# Patient Record
Sex: MALE | Race: WHITE | NOT HISPANIC OR LATINO | Employment: FULL TIME | URBAN - METROPOLITAN AREA
[De-identification: names, ages, dates, MRNs, and addresses within clinical notes are randomized per-mention and may not be internally consistent; named-entity substitution may affect disease eponyms.]

---

## 2023-02-20 LAB — TESTOSTERONE (NG/DL) IN SER/PLAS: 452 NG/DL (ref 240–1000)

## 2023-04-03 LAB
ALANINE AMINOTRANSFERASE (SGPT) (U/L) IN SER/PLAS: 20 U/L (ref 10–52)
ALBUMIN (G/DL) IN SER/PLAS: 4.6 G/DL (ref 3.4–5)
ALKALINE PHOSPHATASE (U/L) IN SER/PLAS: 43 U/L (ref 33–136)
ANION GAP IN SER/PLAS: 12 MMOL/L (ref 10–20)
ASPARTATE AMINOTRANSFERASE (SGOT) (U/L) IN SER/PLAS: 18 U/L (ref 9–39)
BASOPHILS (10*3/UL) IN BLOOD BY AUTOMATED COUNT: 0.05 X10E9/L (ref 0–0.1)
BASOPHILS/100 LEUKOCYTES IN BLOOD BY AUTOMATED COUNT: 0.8 % (ref 0–2)
BILIRUBIN TOTAL (MG/DL) IN SER/PLAS: 0.4 MG/DL (ref 0–1.2)
CALCIUM (MG/DL) IN SER/PLAS: 9.9 MG/DL (ref 8.6–10.6)
CARBON DIOXIDE, TOTAL (MMOL/L) IN SER/PLAS: 30 MMOL/L (ref 21–32)
CHLORIDE (MMOL/L) IN SER/PLAS: 103 MMOL/L (ref 98–107)
CHOLESTEROL (MG/DL) IN SER/PLAS: 197 MG/DL (ref 0–199)
CHOLESTEROL IN HDL (MG/DL) IN SER/PLAS: 35 MG/DL
CHOLESTEROL/HDL RATIO: 5.6
CREATININE (MG/DL) IN SER/PLAS: 1.02 MG/DL (ref 0.5–1.3)
EOSINOPHILS (10*3/UL) IN BLOOD BY AUTOMATED COUNT: 0.29 X10E9/L (ref 0–0.7)
EOSINOPHILS/100 LEUKOCYTES IN BLOOD BY AUTOMATED COUNT: 4.5 % (ref 0–6)
ERYTHROCYTE DISTRIBUTION WIDTH (RATIO) BY AUTOMATED COUNT: 13.7 % (ref 11.5–14.5)
ERYTHROCYTE MEAN CORPUSCULAR HEMOGLOBIN CONCENTRATION (G/DL) BY AUTOMATED: 33.4 G/DL (ref 32–36)
ERYTHROCYTE MEAN CORPUSCULAR VOLUME (FL) BY AUTOMATED COUNT: 94 FL (ref 80–100)
ERYTHROCYTES (10*6/UL) IN BLOOD BY AUTOMATED COUNT: 5.03 X10E12/L (ref 4.5–5.9)
GFR MALE: 81 ML/MIN/1.73M2
GLUCOSE (MG/DL) IN SER/PLAS: 103 MG/DL (ref 74–99)
HEMATOCRIT (%) IN BLOOD BY AUTOMATED COUNT: 47.3 % (ref 41–52)
HEMOGLOBIN (G/DL) IN BLOOD: 15.8 G/DL (ref 13.5–17.5)
IMMATURE GRANULOCYTES/100 LEUKOCYTES IN BLOOD BY AUTOMATED COUNT: 0.3 % (ref 0–0.9)
LDL: 112 MG/DL (ref 0–99)
LEUKOCYTES (10*3/UL) IN BLOOD BY AUTOMATED COUNT: 6.5 X10E9/L (ref 4.4–11.3)
LYMPHOCYTES (10*3/UL) IN BLOOD BY AUTOMATED COUNT: 1.46 X10E9/L (ref 1.2–4.8)
LYMPHOCYTES/100 LEUKOCYTES IN BLOOD BY AUTOMATED COUNT: 22.6 % (ref 13–44)
MONOCYTES (10*3/UL) IN BLOOD BY AUTOMATED COUNT: 0.53 X10E9/L (ref 0.1–1)
MONOCYTES/100 LEUKOCYTES IN BLOOD BY AUTOMATED COUNT: 8.2 % (ref 2–10)
NEUTROPHILS (10*3/UL) IN BLOOD BY AUTOMATED COUNT: 4.12 X10E9/L (ref 1.2–7.7)
NEUTROPHILS/100 LEUKOCYTES IN BLOOD BY AUTOMATED COUNT: 63.6 % (ref 40–80)
NON HDL CHOLESTEROL: 162 MG/DL
PLATELETS (10*3/UL) IN BLOOD AUTOMATED COUNT: 209 X10E9/L (ref 150–450)
POTASSIUM (MMOL/L) IN SER/PLAS: 4.3 MMOL/L (ref 3.5–5.3)
PROTEIN TOTAL: 6.9 G/DL (ref 6.4–8.2)
SODIUM (MMOL/L) IN SER/PLAS: 141 MMOL/L (ref 136–145)
THYROTROPIN (MIU/L) IN SER/PLAS BY DETECTION LIMIT <= 0.05 MIU/L: 1.36 MIU/L (ref 0.44–3.98)
TRIGLYCERIDE (MG/DL) IN SER/PLAS: 248 MG/DL (ref 0–149)
UREA NITROGEN (MG/DL) IN SER/PLAS: 18 MG/DL (ref 6–23)
VLDL: 50 MG/DL (ref 0–40)

## 2023-04-05 LAB — VITAMIN D 1,25-DIHYDROXY: 26.6 PG/ML (ref 19.9–79.3)

## 2023-04-07 ENCOUNTER — OFFICE VISIT (OUTPATIENT)
Dept: PRIMARY CARE | Facility: CLINIC | Age: 68
End: 2023-04-07
Payer: MEDICARE

## 2023-04-07 VITALS
HEART RATE: 88 BPM | DIASTOLIC BLOOD PRESSURE: 90 MMHG | WEIGHT: 203.8 LBS | OXYGEN SATURATION: 98 % | HEIGHT: 69 IN | RESPIRATION RATE: 18 BRPM | SYSTOLIC BLOOD PRESSURE: 142 MMHG | BODY MASS INDEX: 30.18 KG/M2

## 2023-04-07 DIAGNOSIS — R10.32 LEFT INGUINAL PAIN: ICD-10-CM

## 2023-04-07 DIAGNOSIS — E55.9 HYPOVITAMINOSIS D: ICD-10-CM

## 2023-04-07 DIAGNOSIS — I10 BENIGN ESSENTIAL HYPERTENSION: ICD-10-CM

## 2023-04-07 DIAGNOSIS — Z09 FOLLOW UP: ICD-10-CM

## 2023-04-07 DIAGNOSIS — E78.5 DYSLIPIDEMIA (HIGH LDL; LOW HDL): Primary | ICD-10-CM

## 2023-04-07 DIAGNOSIS — R00.0 TACHYCARDIA: ICD-10-CM

## 2023-04-07 PROBLEM — F41.8 SITUATIONAL ANXIETY: Status: ACTIVE | Noted: 2023-04-07

## 2023-04-07 PROBLEM — M19.90 DJD (DEGENERATIVE JOINT DISEASE): Status: ACTIVE | Noted: 2023-04-07

## 2023-04-07 PROBLEM — M51.36 DEGENERATION OF INTERVERTEBRAL DISC OF LUMBAR REGION: Status: ACTIVE | Noted: 2023-04-07

## 2023-04-07 PROBLEM — M51.369 DEGENERATION OF INTERVERTEBRAL DISC OF LUMBAR REGION: Status: ACTIVE | Noted: 2023-04-07

## 2023-04-07 PROBLEM — Z96.641 STATUS POST TOTAL REPLACEMENT OF RIGHT HIP: Status: ACTIVE | Noted: 2023-04-07

## 2023-04-07 PROBLEM — M70.61 TROCHANTERIC BURSITIS OF RIGHT HIP: Status: ACTIVE | Noted: 2023-04-07

## 2023-04-07 PROBLEM — E66.811 CLASS 1 OBESITY WITH BODY MASS INDEX (BMI) OF 31.0 TO 31.9 IN ADULT: Status: ACTIVE | Noted: 2023-04-07

## 2023-04-07 PROBLEM — N40.0 BPH (BENIGN PROSTATIC HYPERPLASIA): Status: ACTIVE | Noted: 2023-04-07

## 2023-04-07 PROBLEM — E66.9 CLASS 1 OBESITY WITH BODY MASS INDEX (BMI) OF 31.0 TO 31.9 IN ADULT: Status: ACTIVE | Noted: 2023-04-07

## 2023-04-07 PROBLEM — M54.9 BACK PAIN: Status: ACTIVE | Noted: 2023-04-07

## 2023-04-07 PROBLEM — R26.9 GAIT ABNORMALITY: Status: ACTIVE | Noted: 2023-04-07

## 2023-04-07 PROBLEM — M54.41 LUMBAGO WITH SCIATICA, RIGHT SIDE: Status: ACTIVE | Noted: 2023-04-07

## 2023-04-07 PROBLEM — M17.12 PRIMARY OSTEOARTHRITIS OF LEFT KNEE: Status: ACTIVE | Noted: 2023-04-07

## 2023-04-07 PROBLEM — R29.898 WEAKNESS OF BOTH LOWER EXTREMITIES: Status: ACTIVE | Noted: 2023-04-07

## 2023-04-07 PROBLEM — N52.9 ERECTILE DYSFUNCTION: Status: ACTIVE | Noted: 2023-04-07

## 2023-04-07 PROBLEM — M16.11 PRIMARY OSTEOARTHRITIS OF RIGHT HIP: Status: ACTIVE | Noted: 2023-04-07

## 2023-04-07 PROBLEM — R60.0 LOWER EXTREMITY EDEMA: Status: ACTIVE | Noted: 2023-04-07

## 2023-04-07 PROBLEM — M48.07 SPINAL STENOSIS OF LUMBOSACRAL REGION: Status: ACTIVE | Noted: 2023-04-07

## 2023-04-07 PROBLEM — E29.1 TESTICULAR HYPERGONADOTROPIC HYPOGONADISM: Status: ACTIVE | Noted: 2023-04-07

## 2023-04-07 PROBLEM — M47.816 LUMBAR SPONDYLOSIS: Status: ACTIVE | Noted: 2023-04-07

## 2023-04-07 PROBLEM — R63.5 WEIGHT GAIN: Status: ACTIVE | Noted: 2023-04-07

## 2023-04-07 PROBLEM — R06.09 DYSPNEA ON EXERTION: Status: ACTIVE | Noted: 2023-04-07

## 2023-04-07 PROBLEM — M54.16 RIGHT LUMBAR RADICULOPATHY: Status: ACTIVE | Noted: 2023-04-07

## 2023-04-07 PROCEDURE — 3080F DIAST BP >= 90 MM HG: CPT | Performed by: NURSE PRACTITIONER

## 2023-04-07 PROCEDURE — 3077F SYST BP >= 140 MM HG: CPT | Performed by: NURSE PRACTITIONER

## 2023-04-07 PROCEDURE — 1159F MED LIST DOCD IN RCRD: CPT | Performed by: NURSE PRACTITIONER

## 2023-04-07 PROCEDURE — 1036F TOBACCO NON-USER: CPT | Performed by: NURSE PRACTITIONER

## 2023-04-07 PROCEDURE — 3008F BODY MASS INDEX DOCD: CPT | Performed by: NURSE PRACTITIONER

## 2023-04-07 PROCEDURE — 99214 OFFICE O/P EST MOD 30 MIN: CPT | Performed by: NURSE PRACTITIONER

## 2023-04-07 PROCEDURE — 1160F RVW MEDS BY RX/DR IN RCRD: CPT | Performed by: NURSE PRACTITIONER

## 2023-04-07 RX ORDER — SYRINGE WITH NEEDLE, 1 ML 25GX5/8"
SYRINGE, EMPTY DISPOSABLE MISCELLANEOUS
COMMUNITY
End: 2024-03-19 | Stop reason: WASHOUT

## 2023-04-07 RX ORDER — TESTOSTERONE CYPIONATE 200 MG/ML
200 INJECTION, SOLUTION INTRAMUSCULAR
COMMUNITY
Start: 2022-05-02 | End: 2024-03-19 | Stop reason: WASHOUT

## 2023-04-07 RX ORDER — TADALAFIL 20 MG/1
20 TABLET ORAL AS NEEDED
COMMUNITY
Start: 2022-02-15 | End: 2024-04-11 | Stop reason: SDUPTHER

## 2023-04-07 RX ORDER — LOSARTAN POTASSIUM 100 MG/1
1 TABLET ORAL DAILY
COMMUNITY
Start: 2021-08-24 | End: 2023-10-10 | Stop reason: SDUPTHER

## 2023-04-07 RX ORDER — ERGOCALCIFEROL 1.25 MG/1
50000 CAPSULE ORAL
COMMUNITY
Start: 2020-02-12

## 2023-04-07 RX ORDER — TRIAMTERENE/HYDROCHLOROTHIAZID 37.5-25 MG
1 TABLET ORAL 2 TIMES DAILY
COMMUNITY
Start: 2021-08-23 | End: 2023-10-10 | Stop reason: SDUPTHER

## 2023-04-07 RX ORDER — SYRINGE W-NEEDLE,DISPOSAB,3 ML 25GX5/8"
SYRINGE, EMPTY DISPOSABLE MISCELLANEOUS
COMMUNITY
Start: 2022-05-02 | End: 2024-03-19 | Stop reason: WASHOUT

## 2023-04-07 RX ORDER — SYRINGE W-NEEDLE,DISPOSAB,3 ML 25GX5/8"
SYRINGE, EMPTY DISPOSABLE MISCELLANEOUS
COMMUNITY
End: 2024-03-19 | Stop reason: WASHOUT

## 2023-04-07 RX ORDER — ROSUVASTATIN CALCIUM 10 MG/1
10 TABLET, COATED ORAL DAILY
Qty: 90 TABLET | Refills: 1 | Status: SHIPPED | OUTPATIENT
Start: 2023-04-07 | End: 2023-10-10 | Stop reason: ALTCHOICE

## 2023-04-07 RX ORDER — KETOROLAC TROMETHAMINE 30 MG/ML
2 INJECTION, SOLUTION INTRAMUSCULAR; INTRAVENOUS
COMMUNITY
Start: 2022-01-27 | End: 2023-10-10 | Stop reason: ALTCHOICE

## 2023-04-07 RX ORDER — METOPROLOL SUCCINATE 100 MG/1
1 TABLET, EXTENDED RELEASE ORAL 2 TIMES DAILY
COMMUNITY
Start: 2021-10-27 | End: 2023-10-10 | Stop reason: SDUPTHER

## 2023-04-07 RX ORDER — BROMPHENIRAMINE MALEATE, PSEUDOEPHEDRINE HYDROCHLORIDE, AND DEXTROMETHORPHAN HYDROBROMIDE 2; 30; 10 MG/5ML; MG/5ML; MG/5ML
10 SYRUP ORAL EVERY 6 HOURS PRN
COMMUNITY
End: 2023-04-10 | Stop reason: ALTCHOICE

## 2023-04-07 ASSESSMENT — ANXIETY QUESTIONNAIRES
5. BEING SO RESTLESS THAT IT IS HARD TO SIT STILL: NOT AT ALL
2. NOT BEING ABLE TO STOP OR CONTROL WORRYING: NOT AT ALL
GAD7 TOTAL SCORE: 0
7. FEELING AFRAID AS IF SOMETHING AWFUL MIGHT HAPPEN: NOT AT ALL
1. FEELING NERVOUS, ANXIOUS, OR ON EDGE: NOT AT ALL
4. TROUBLE RELAXING: NOT AT ALL
6. BECOMING EASILY ANNOYED OR IRRITABLE: NOT AT ALL
3. WORRYING TOO MUCH ABOUT DIFFERENT THINGS: NOT AT ALL

## 2023-04-07 ASSESSMENT — PAIN SCALES - GENERAL: PAINLEVEL: 0-NO PAIN

## 2023-04-07 ASSESSMENT — ENCOUNTER SYMPTOMS
DEPRESSION: 0
OCCASIONAL FEELINGS OF UNSTEADINESS: 0
LOSS OF SENSATION IN FEET: 0

## 2023-04-07 ASSESSMENT — PATIENT HEALTH QUESTIONNAIRE - PHQ9
SUM OF ALL RESPONSES TO PHQ9 QUESTIONS 1 AND 2: 0
1. LITTLE INTEREST OR PLEASURE IN DOING THINGS: NOT AT ALL
2. FEELING DOWN, DEPRESSED OR HOPELESS: NOT AT ALL

## 2023-04-07 NOTE — PATIENT INSTRUCTIONS
Start Rosuvastatin 10mg daily for high cholesterol. Continue with all current medications and follow up in 6 months.

## 2023-04-10 PROBLEM — E66.9 CLASS 1 OBESITY WITH BODY MASS INDEX (BMI) OF 31.0 TO 31.9 IN ADULT: Status: RESOLVED | Noted: 2023-04-07 | Resolved: 2023-04-10

## 2023-04-10 PROBLEM — E66.811 CLASS 1 OBESITY WITH BODY MASS INDEX (BMI) OF 31.0 TO 31.9 IN ADULT: Status: RESOLVED | Noted: 2023-04-07 | Resolved: 2023-04-10

## 2023-04-11 NOTE — PROGRESS NOTES
"Subjective   Patient ID: Michael Babb is a 67 y.o. male who presents for Follow-up (When patient coughs has left groin pain Possible).    Patient is coming in for lab results review and management of multiple chronic diseases.  His lab results shows elevated lipid panel.  He presents with new onset of left groin pain that started a couple weeks ago.  States that he feels like something is being pinched.  He denies preceding fall or trauma.  Reports that he needs a total left knee surgery due to severe osteoarthritis.  States that he plans to get the surgery done sometime in September of this year.         Review of Systems   Musculoskeletal:         As in HPI   All other systems reviewed and are negative.      Objective   /90 (BP Location: Left arm, Patient Position: Sitting, BP Cuff Size: Adult)   Pulse 88   Resp 18   Ht 1.753 m (5' 9\")   Wt 92.4 kg (203 lb 12.8 oz)   SpO2 98%   BMI 30.10 kg/m²     Physical Exam  HENT:      Head: Normocephalic and atraumatic.      Right Ear: External ear normal.      Left Ear: External ear normal.      Nose: Nose normal.      Mouth/Throat:      Mouth: Mucous membranes are moist.   Cardiovascular:      Rate and Rhythm: Normal rate and regular rhythm.      Pulses: Normal pulses.      Heart sounds: Normal heart sounds.   Pulmonary:      Effort: Pulmonary effort is normal.      Breath sounds: Normal breath sounds.   Abdominal:      General: Bowel sounds are normal.   Musculoskeletal:         General: Normal range of motion.      Cervical back: Normal range of motion and neck supple.   Skin:     General: Skin is warm and dry.   Neurological:      General: No focal deficit present.      Mental Status: He is alert and oriented to person, place, and time.   Psychiatric:         Mood and Affect: Mood normal.         Behavior: Behavior normal.         Thought Content: Thought content normal.         Judgment: Judgment normal.         Assessment/Plan   Problem List Items " Addressed This Visit          Circulatory    Benign essential hypertension    Tachycardia       Endocrine/Metabolic    Hypovitaminosis D       Other    Dyslipidemia (high LDL; low HDL) - Primary    Relevant Medications    rosuvastatin (Crestor) 10 mg tablet    Other Relevant Orders    Comprehensive Metabolic Panel    Lipid panel     Other Visit Diagnoses       Follow up        Relevant Orders    Follow Up In [facility] - PCP    Left inguinal pain        Relevant Orders    CT abdomen pelvis wo IV contrast    BMI 30.0-30.9,adult

## 2023-04-12 ENCOUNTER — TELEPHONE (OUTPATIENT)
Dept: PRIMARY CARE | Facility: CLINIC | Age: 68
End: 2023-04-12
Payer: MEDICARE

## 2023-04-12 NOTE — TELEPHONE ENCOUNTER
----- Message from JOSS Nava sent at 4/12/2023  1:12 PM EDT -----  Please, tell patient that his CT scan result is normal with no acute finding.

## 2023-05-01 ENCOUNTER — TELEPHONE (OUTPATIENT)
Dept: PRIMARY CARE | Facility: CLINIC | Age: 68
End: 2023-05-01
Payer: MEDICARE

## 2023-05-01 NOTE — TELEPHONE ENCOUNTER
Knee surgery 5/31/23 Dr Garnica said they were sending presurgical clearance form over, patient wondering if we had form and if you will fill it out since he just saw you   [phone]

## 2023-05-31 ENCOUNTER — HOSPITAL ENCOUNTER (OUTPATIENT)
Dept: DATA CONVERSION | Facility: HOSPITAL | Age: 68
End: 2023-06-01
Attending: SPECIALIST | Admitting: SPECIALIST
Payer: MEDICARE

## 2023-05-31 DIAGNOSIS — G47.33 OBSTRUCTIVE SLEEP APNEA (ADULT) (PEDIATRIC): ICD-10-CM

## 2023-05-31 DIAGNOSIS — M21.162 VARUS DEFORMITY, NOT ELSEWHERE CLASSIFIED, LEFT KNEE: ICD-10-CM

## 2023-05-31 DIAGNOSIS — I10 ESSENTIAL (PRIMARY) HYPERTENSION: ICD-10-CM

## 2023-05-31 DIAGNOSIS — E78.5 HYPERLIPIDEMIA, UNSPECIFIED: ICD-10-CM

## 2023-05-31 DIAGNOSIS — M17.12 UNILATERAL PRIMARY OSTEOARTHRITIS, LEFT KNEE: ICD-10-CM

## 2023-05-31 LAB
ABO GROUP (TYPE) IN BLOOD: NORMAL
ANTIBODY SCREEN: NORMAL
RH FACTOR: NORMAL

## 2023-06-01 LAB
ANION GAP IN SER/PLAS: 14 MMOL/L (ref 10–20)
BASOPHILS (10*3/UL) IN BLOOD BY AUTOMATED COUNT: 0.04 X10E9/L (ref 0–0.1)
BASOPHILS/100 LEUKOCYTES IN BLOOD BY AUTOMATED COUNT: 0.2 % (ref 0–2)
CALCIUM (MG/DL) IN SER/PLAS: 8.7 MG/DL (ref 8.6–10.3)
CARBON DIOXIDE, TOTAL (MMOL/L) IN SER/PLAS: 24 MMOL/L (ref 21–32)
CHLORIDE (MMOL/L) IN SER/PLAS: 105 MMOL/L (ref 98–107)
CREATININE (MG/DL) IN SER/PLAS: 1.15 MG/DL (ref 0.5–1.3)
ERYTHROCYTE DISTRIBUTION WIDTH (RATIO) BY AUTOMATED COUNT: 13.3 % (ref 11.5–14.5)
ERYTHROCYTE MEAN CORPUSCULAR HEMOGLOBIN CONCENTRATION (G/DL) BY AUTOMATED: 33.9 G/DL (ref 32–36)
ERYTHROCYTE MEAN CORPUSCULAR VOLUME (FL) BY AUTOMATED COUNT: 94 FL (ref 80–100)
ERYTHROCYTES (10*6/UL) IN BLOOD BY AUTOMATED COUNT: 3.88 X10E12/L (ref 4.5–5.9)
GFR MALE: 70 ML/MIN/1.73M2
GLUCOSE (MG/DL) IN SER/PLAS: 133 MG/DL (ref 74–99)
HEMATOCRIT (%) IN BLOOD BY AUTOMATED COUNT: 36.3 % (ref 41–52)
HEMOGLOBIN (G/DL) IN BLOOD: 12.3 G/DL (ref 13.5–17.5)
IMMATURE GRANULOCYTES/100 LEUKOCYTES IN BLOOD BY AUTOMATED COUNT: 0.5 % (ref 0–0.9)
LEUKOCYTES (10*3/UL) IN BLOOD BY AUTOMATED COUNT: 17.5 X10E9/L (ref 4.4–11.3)
LYMPHOCYTES (10*3/UL) IN BLOOD BY AUTOMATED COUNT: 0.8 X10E9/L (ref 1.2–4.8)
LYMPHOCYTES/100 LEUKOCYTES IN BLOOD BY AUTOMATED COUNT: 4.6 % (ref 13–44)
MONOCYTES (10*3/UL) IN BLOOD BY AUTOMATED COUNT: 0.76 X10E9/L (ref 0.1–1)
MONOCYTES/100 LEUKOCYTES IN BLOOD BY AUTOMATED COUNT: 4.4 % (ref 2–10)
NEUTROPHILS (10*3/UL) IN BLOOD BY AUTOMATED COUNT: 15.77 X10E9/L (ref 1.2–7.7)
NEUTROPHILS/100 LEUKOCYTES IN BLOOD BY AUTOMATED COUNT: 90.3 % (ref 40–80)
PLATELETS (10*3/UL) IN BLOOD AUTOMATED COUNT: 200 X10E9/L (ref 150–450)
POTASSIUM (MMOL/L) IN SER/PLAS: 3.7 MMOL/L (ref 3.5–5.3)
SODIUM (MMOL/L) IN SER/PLAS: 139 MMOL/L (ref 136–145)
UREA NITROGEN (MG/DL) IN SER/PLAS: 27 MG/DL (ref 6–23)

## 2023-08-23 LAB
BASOPHILS (10*3/UL) IN BLOOD BY AUTOMATED COUNT: 0.11 X10E9/L (ref 0–0.1)
BASOPHILS/100 LEUKOCYTES IN BLOOD BY AUTOMATED COUNT: 1.8 % (ref 0–2)
C REACTIVE PROTEIN (MG/L) IN SER/PLAS: 0.18 MG/DL
EOSINOPHILS (10*3/UL) IN BLOOD BY AUTOMATED COUNT: 0.51 X10E9/L (ref 0–0.7)
EOSINOPHILS/100 LEUKOCYTES IN BLOOD BY AUTOMATED COUNT: 8.3 % (ref 0–6)
ERYTHROCYTE DISTRIBUTION WIDTH (RATIO) BY AUTOMATED COUNT: 13.7 % (ref 11.5–14.5)
ERYTHROCYTE MEAN CORPUSCULAR HEMOGLOBIN CONCENTRATION (G/DL) BY AUTOMATED: 32.4 G/DL (ref 32–36)
ERYTHROCYTE MEAN CORPUSCULAR VOLUME (FL) BY AUTOMATED COUNT: 94 FL (ref 80–100)
ERYTHROCYTES (10*6/UL) IN BLOOD BY AUTOMATED COUNT: 4.51 X10E12/L (ref 4.5–5.9)
HEMATOCRIT (%) IN BLOOD BY AUTOMATED COUNT: 42.6 % (ref 41–52)
HEMOGLOBIN (G/DL) IN BLOOD: 13.8 G/DL (ref 13.5–17.5)
IMMATURE GRANULOCYTES/100 LEUKOCYTES IN BLOOD BY AUTOMATED COUNT: 0.5 % (ref 0–0.9)
LEUKOCYTES (10*3/UL) IN BLOOD BY AUTOMATED COUNT: 6.1 X10E9/L (ref 4.4–11.3)
LYMPHOCYTES (10*3/UL) IN BLOOD BY AUTOMATED COUNT: 1.78 X10E9/L (ref 1.2–4.8)
LYMPHOCYTES/100 LEUKOCYTES IN BLOOD BY AUTOMATED COUNT: 29.1 % (ref 13–44)
MONOCYTES (10*3/UL) IN BLOOD BY AUTOMATED COUNT: 0.58 X10E9/L (ref 0.1–1)
MONOCYTES/100 LEUKOCYTES IN BLOOD BY AUTOMATED COUNT: 9.5 % (ref 2–10)
NEUTROPHILS (10*3/UL) IN BLOOD BY AUTOMATED COUNT: 3.1 X10E9/L (ref 1.2–7.7)
NEUTROPHILS/100 LEUKOCYTES IN BLOOD BY AUTOMATED COUNT: 50.8 % (ref 40–80)
PLATELETS (10*3/UL) IN BLOOD AUTOMATED COUNT: 202 X10E9/L (ref 150–450)
SEDIMENTATION RATE, ERYTHROCYTE: 3 MM/H (ref 0–20)

## 2023-09-07 VITALS — HEIGHT: 69 IN | WEIGHT: 192.9 LBS | BODY MASS INDEX: 28.57 KG/M2

## 2023-09-30 NOTE — H&P
History & Physical Reviewed:   I have reviewed the History and Physical dated:  11-May-2023   History and Physical reviewed and relevant findings noted. Patient examined to review pertinent physical  findings.: No significant changes   Home Medications Reviewed: no changes noted   Allergies Reviewed: no changes noted       ERAS (Enhanced Recovery After Surgery):  ·  ERAS Patient: no     Consent:   COVID-19 Consent:  ·  COVID-19 Risk Consent Surgeon has reviewed key risks related to the risk of eb COVID-19 and if they contract COVID-19 what the risks are.       Electronic Signatures:  Baldo Garnica)  (Signed 31-May-2023 06:39)   Authored: History & Physical Reviewed, ERAS, Consent,  Note Completion      Last Updated: 31-May-2023 06:39 by Baldo Garnica)

## 2023-09-30 NOTE — DISCHARGE SUMMARY
Send Summary:   Discharge Summary Providers:  Provider Role Provider Name   · Attending Baldo Garnica   · Referring Baldo Garnica   · Primary Lucius Whalen   · Primary Vanda Zimmerman       Note Recipients: Vanda Zimmerman, DO - [phone]  []  Lucius Whalen, APRN-CNP - [phone] []       Discharge:    Summary:   Admission Date: .31-May-2023 09:04:00   Discharge Date: 01-Jun-2023   Attending Physician at Discharge: Baldo Garnica   Admission Reason: Same-day admission for left total  knee arthroplasty   Final Discharge Diagnoses: Osteoarthritis of left  knee   Procedures: Date: 31-May-2023 12:59:00  Procedure Name: 1. LEFT KNEE ARTHROPLASTY, Alessandro triathlon uncemented   Condition at Discharge: Satisfactory   Disposition at Discharge: Home Health Care - New   Vital Signs:        T   P  R  BP   MAP  SpO2   Value  36.5  57  16  119/63      94%  Date/Time 6/1 5:31 6/1 5:31 6/1 5:31 6/1 5:31    6/1 5:31  Range  (36.3C - 36.7C )  (57 - 78 )  (16 - 18 )  (108 - 146 )/ (62 - 74 )    (94% - 97% )    Date:            Weight/Scale Type:  Height:   31-May-2023 15:15  87.5  kg / bed  175.2  cm  Hospital Course:    Patient was admitted for same-day surgery, total knee arthroplasty.  Patient had uneventful surgery and recovery in the PACU.  Patient was then admitted to the postsurgical  [facility] where they underwent all routine post total knee nursing, therapy, and case management protocols.  Prior to discharge we are able to get patient's pain under control and deemed discharge safe with therapy assessments.  DVT prophylaxis maintained  throughout.  Was discharged home with home care, registered nursing and physical therapy    Immunizations:    Immunizations:  10-Apr-2021   SARS-CoV-2 (COVID-19): Immunizations, 10-Apr-2021  20-Mar-2021   SARS-CoV-2 (COVID-19): Immunizations, 20-Mar-2021      Discharge Information:    and Continuing Care:   Lab Results - Pending:    Basic Metabolic  Panel Drawn at 2023 05:09:00  Radiology Results - Pending: None   Discharge Instructions:    Activity:           activity as tolerated.          May shower..  Dressing is waterproof may shower knee          Weight-bearing Instructions: weight-bearing as tolerated.            Use thigh-high RUDDY hose during day for 1 month postop to prevent swelling and blood clots    Nutrition/Diet:           resume normal diet    Wound Care:           Wound Site:   Left knee          Wound Type:   surgical incision          Change Dressin time          Cleanse With:   soap and water          Other Instructions:   Home nurse to remove surgical dressing 1 week postop, leave open to air if dry.  Home nurse to remove surgical staples 2 weeks postop, and apply Steri-Strips.  Please call surgeon's office if any issues with wound healing    Home Care Certification:           Home Care Agency:    [facility] [phone]          Skilled Disciplines Ordered:   RN/LPN,  PT    Follow Up Appointments:    Follow-Up Appointment 01:           Physician/Dept/Service:   Danika          Reason for Referral:   First orthopedic postop office visit with knee x-rays          Call to Schedule in:   4 weeks          Location:    [facility]          Phone Number:   [phone]    Discharge Medications: Home Medication   Vitamin D3 25 mcg oral tablet - 1 cap(s) oral once a day  metoprolol 100 mg oral tablet, extended release - 1 cap(s) oral twice a day  Multiple Vitamins Essentials - 1 cap(s) oral once a day  hydrochlorothiazide-triamterene 25 mg-37.5 mg oral capsule - 1 cap(s) oral twice a day  losartan 100 mg oral tablet - 1 cap(s) oral once a day  rosuvastatin 10 mg oral tablet - 1 cap(s) oral once a day at bedtime     PRN Medication     DNR Status:   ·  Code Status Code Status order at time of discharge: Full Code       Electronic Signatures:  Baldo Garnica)  (Signed 2023 07:01)   Authored: Send Summary,  Summary Content, Immunizations,  Ongoing Care, DNR Status, Note Completion      Last Updated: 01-Jun-2023 07:01 by Baldo Garnica)

## 2023-10-02 NOTE — OP NOTE
PROCEDURE DETAILS    Preoperative Diagnosis:  Primary localized osteoarthritis of left knee, M17.12    Postoperative Diagnosis:  Primary localized osteoarthritis of left knee, M17.12    Surgeon: Baldo Garnica  Resident/Fellow/Other Assistant: Ki Ceballos; Morris Christianson    Procedure:  1. LEFT KNEE ARTHROPLASTY, Alessandro triathlon uncemented    Anesthesia: General plus block  Estimated Blood Loss: 100  Findings: Severe osteoarthritis of the knee with varus deformity  Implants: [place] uncemented triathlon, #6 femur, #6 tibial plateau, 11 mm CS poly, 35 x 10 patella        Operative Report:   After an appropriate block in preoperative unit, patient was brought back to the OR and placed supine on the OR table. They were then induced under a general anesthetic and given 1  g of IV TXA and IV antibiotics. The operative lower extremity was then sterilely prepped and draped in the usual manner, a proximal thigh tourniquet was applied in standard total knee sterility draping was maintained throughout. At the start of the case  we exsanguinated the operative lower extremity and elevated the thigh tourniquet to 300 mmHg. Using a 10 blade we then made a 10-15 cm anterior longitudinal incision over the knee in the usual manner. After sharp dissection through the dermis and subcutaneous  plane the extensor mechanism was exposed. With a fresh 10 blade we then did our medial parapatellar capsular incision. This allowed us to retract the capsule and patella and identify the contents within the knee. We used a combination of 10 blade and  electrocautery to then do a limited synovectomy to exposed the distal femur and proximal tibia. We released partially the MCL insertion at the tibia maintaining a periosteal cuff and removed osteophytes around the patella femur and tibia with a rongeur  and osteotome. We then everted the patella and flexed the knee to identify the distal femur. After resecting the anterior cruciate  ligament 1 cm above the notch slightly medial we then placed our IM drill guide into the femur and drilled into the center  of the IM canal. We placed our and cutting IM guide down the femur and set it up for a 6°, 8 mm resection of the end of the femur. With the block pinned in place we then used a sagittal saw to cut the femur maintaining irrigation the bone is being  cut. We then removed the cutting block. We used the posterior referencing sizing guide for the end of the femur then measured for the appropriate sized femur. We use templating device to find our 2 holes along the epicondylar line in 3° of external  rotation and drilled those holes through the 4-in-1 and cutting drill block. We then tapped into place the end-cutting soft block for the femur and pinned in place. Using a sagittal saw and irrigating bone we then did our condylar and chamfer cuts of  the end of the femur. We then used our extramedullary guide for the tibia and sizing it for the correct mechanical axis of the lower extremity down the middle of the ankle and second ray of the foot tapped the extra medullary guide in place on the tibial  plateau. We then used the out  depth guide to allow at least 2 mm of bone resection from the deficient medial tibial plateau. And then we pinned our cutting block in place maintaining the mechanical and with the 3° posterior slope. Using the  tibial cutting block we then cut the tibial plateau in the usual manner with a sagittal saw taking care to protect posterior structures. We remove the tibial plateau cut and noted symmetric cut. We then everted the patella and hold it stable with rao  clamps and a ronguer, we measured the thickness patella and with the sagittal saw along the articular surface removed  the degenerative chondral and subchondral bone to allow for an appropriate fitted patellar replacement. We then drilled our 3 patellar  holes appropriate size patella. We then flexed the knee we  used a lamina  to release the posterior capsule and removed posterior condylar femoral osteophytes. We then infiltrated the periosteum and posterior capsules with 30 cc of her Marcaine  and epinephrine and Toradol mixture. Using the gap  we noted anatomic alignment of the bone cuts and symmetric soft tissue releasing in both extension and mid flexion. Soft tissue balancing noted the correct we then placed our sizing guide for  the tibial plateau and did our proximal tibial was punched for the final implant and placed our trial implants on the distal femur proximal tibia with the appropriate sized poly-trial and patella. The trial noted full range of motion to 140° flexion  full extension neutral mechanical alignment and balanced soft tissues.  We then removed the trial components and placed our 3 final permanent components, also confirming full range of motion and stable balancing.  We then soaked the total knee implants  with dilute Betadine saline for 3 minutes. We gave the patient is second 1 g IV dose of TXA. We then let down the tourniquet tourniquet time was noted in the chart and appropriate. Controlled intracapsular bleeding with electrocautery was dry. We did  our final 30 cc of Marcaine and epinephrine and Toradol infiltration in the capsular structures and subdermal tissues. We then did our standard closure of the capsule with the combination of running and locking #1 Vicryl suture the subcutaneous dermis  was closed with interrupted buried 2-0 Vicryl and the skin with surgical staples. Again the final range of motion of the joint noted that it was full and stable. A 4 placed our sterile dressing/compressive. An noted maintained good perfusion the extremity  at the end of the case. Patient was then transferred recovery in stable condition there were no complications and blood loss specimens noted above.                        Attestation:   Note Completion:  Attending Attestation I  performed the procedure without a resident         Electronic Signatures:  Baldo Garnica)  (Signed 31-May-2023 13:02)   Authored: Post-Operative Note, Chart Review, Note Completion      Last Updated: 31-May-2023 13:02 by Baldo Garnica)

## 2023-10-04 ENCOUNTER — LAB (OUTPATIENT)
Dept: LAB | Facility: LAB | Age: 68
End: 2023-10-04
Payer: MEDICARE

## 2023-10-04 DIAGNOSIS — E78.5 DYSLIPIDEMIA (HIGH LDL; LOW HDL): ICD-10-CM

## 2023-10-04 LAB
ALBUMIN SERPL BCP-MCNC: 4.9 G/DL (ref 3.4–5)
ALP SERPL-CCNC: 56 U/L (ref 33–136)
ALT SERPL W P-5'-P-CCNC: 21 U/L (ref 10–52)
ANION GAP SERPL CALC-SCNC: 14 MMOL/L (ref 10–20)
AST SERPL W P-5'-P-CCNC: 18 U/L (ref 9–39)
BILIRUB SERPL-MCNC: 0.5 MG/DL (ref 0–1.2)
BUN SERPL-MCNC: 24 MG/DL (ref 6–23)
CALCIUM SERPL-MCNC: 10 MG/DL (ref 8.6–10.6)
CHLORIDE SERPL-SCNC: 105 MMOL/L (ref 98–107)
CHOLEST SERPL-MCNC: 135 MG/DL (ref 0–199)
CHOLESTEROL/HDL RATIO: 2.8
CO2 SERPL-SCNC: 29 MMOL/L (ref 21–32)
CREAT SERPL-MCNC: 0.98 MG/DL (ref 0.5–1.3)
GFR SERPL CREATININE-BSD FRML MDRD: 85 ML/MIN/1.73M*2
GLUCOSE SERPL-MCNC: 103 MG/DL (ref 74–99)
HDLC SERPL-MCNC: 48.8 MG/DL
LDLC SERPL CALC-MCNC: 53 MG/DL (ref 140–190)
NON HDL CHOLESTEROL: 86 MG/DL (ref 0–149)
POTASSIUM SERPL-SCNC: 4.4 MMOL/L (ref 3.5–5.3)
PROT SERPL-MCNC: 7.6 G/DL (ref 6.4–8.2)
SODIUM SERPL-SCNC: 144 MMOL/L (ref 136–145)
TRIGL SERPL-MCNC: 168 MG/DL (ref 0–149)
VLDL: 34 MG/DL (ref 0–40)

## 2023-10-04 PROCEDURE — 36415 COLL VENOUS BLD VENIPUNCTURE: CPT

## 2023-10-10 ENCOUNTER — OFFICE VISIT (OUTPATIENT)
Dept: PRIMARY CARE | Facility: CLINIC | Age: 68
End: 2023-10-10
Payer: MEDICARE

## 2023-10-10 VITALS
HEART RATE: 83 BPM | RESPIRATION RATE: 16 BRPM | HEIGHT: 69 IN | WEIGHT: 209 LBS | OXYGEN SATURATION: 99 % | DIASTOLIC BLOOD PRESSURE: 64 MMHG | BODY MASS INDEX: 30.96 KG/M2 | SYSTOLIC BLOOD PRESSURE: 102 MMHG

## 2023-10-10 DIAGNOSIS — Z00.00 MEDICARE ANNUAL WELLNESS VISIT, SUBSEQUENT: Primary | ICD-10-CM

## 2023-10-10 DIAGNOSIS — Z71.89 ADVANCE DIRECTIVE DISCUSSED WITH PATIENT: ICD-10-CM

## 2023-10-10 DIAGNOSIS — E55.9 HYPOVITAMINOSIS D: ICD-10-CM

## 2023-10-10 DIAGNOSIS — Z23 FLU VACCINE NEED: ICD-10-CM

## 2023-10-10 DIAGNOSIS — Z12.5 SCREENING FOR PROSTATE CANCER: ICD-10-CM

## 2023-10-10 DIAGNOSIS — I10 BENIGN ESSENTIAL HYPERTENSION: ICD-10-CM

## 2023-10-10 DIAGNOSIS — E78.5 DYSLIPIDEMIA (HIGH LDL; LOW HDL): ICD-10-CM

## 2023-10-10 DIAGNOSIS — N52.9 ERECTILE DYSFUNCTION, UNSPECIFIED ERECTILE DYSFUNCTION TYPE: ICD-10-CM

## 2023-10-10 PROBLEM — L57.0 ACTINIC KERATOSIS: Status: ACTIVE | Noted: 2023-08-14

## 2023-10-10 PROBLEM — L72.3 SEBACEOUS CYST: Status: ACTIVE | Noted: 2023-08-14

## 2023-10-10 PROBLEM — S60.450A: Status: ACTIVE | Noted: 2023-08-14

## 2023-10-10 PROBLEM — M25.662 DECREASED RANGE OF MOTION OF LEFT KNEE: Status: ACTIVE | Noted: 2023-10-10

## 2023-10-10 PROBLEM — L98.8 OTHER SPECIFIED DISORDERS OF THE SKIN AND SUBCUTANEOUS TISSUE: Status: ACTIVE | Noted: 2023-08-14

## 2023-10-10 PROBLEM — Z96.652 HISTORY OF TOTAL LEFT KNEE REPLACEMENT: Status: ACTIVE | Noted: 2023-10-10

## 2023-10-10 PROBLEM — L82.0 INFLAMED SEBORRHEIC KERATOSIS: Status: ACTIVE | Noted: 2023-08-14

## 2023-10-10 PROBLEM — L72.0 EPIDERMAL CYST: Status: ACTIVE | Noted: 2023-08-14

## 2023-10-10 PROBLEM — G47.30 SLEEP APNEA WITH USE OF CONTINUOUS POSITIVE AIRWAY PRESSURE (CPAP): Status: ACTIVE | Noted: 2019-04-11

## 2023-10-10 PROBLEM — L81.9 DISORDER OF PIGMENTATION, UNSPECIFIED: Status: ACTIVE | Noted: 2023-08-14

## 2023-10-10 PROBLEM — L81.4 OTHER MELANIN HYPERPIGMENTATION: Status: ACTIVE | Noted: 2023-08-14

## 2023-10-10 PROBLEM — L82.1 OTHER SEBORRHEIC KERATOSIS: Status: ACTIVE | Noted: 2023-08-14

## 2023-10-10 PROCEDURE — 90662 IIV NO PRSV INCREASED AG IM: CPT | Performed by: NURSE PRACTITIONER

## 2023-10-10 PROCEDURE — G0444 DEPRESSION SCREEN ANNUAL: HCPCS | Performed by: NURSE PRACTITIONER

## 2023-10-10 PROCEDURE — 1126F AMNT PAIN NOTED NONE PRSNT: CPT | Performed by: NURSE PRACTITIONER

## 2023-10-10 PROCEDURE — 3074F SYST BP LT 130 MM HG: CPT | Performed by: NURSE PRACTITIONER

## 2023-10-10 PROCEDURE — 3008F BODY MASS INDEX DOCD: CPT | Performed by: NURSE PRACTITIONER

## 2023-10-10 PROCEDURE — 3078F DIAST BP <80 MM HG: CPT | Performed by: NURSE PRACTITIONER

## 2023-10-10 PROCEDURE — 99497 ADVNCD CARE PLAN 30 MIN: CPT | Performed by: NURSE PRACTITIONER

## 2023-10-10 PROCEDURE — 1159F MED LIST DOCD IN RCRD: CPT | Performed by: NURSE PRACTITIONER

## 2023-10-10 PROCEDURE — 99214 OFFICE O/P EST MOD 30 MIN: CPT | Performed by: NURSE PRACTITIONER

## 2023-10-10 PROCEDURE — G0439 PPPS, SUBSEQ VISIT: HCPCS | Performed by: NURSE PRACTITIONER

## 2023-10-10 PROCEDURE — 1160F RVW MEDS BY RX/DR IN RCRD: CPT | Performed by: NURSE PRACTITIONER

## 2023-10-10 PROCEDURE — 1036F TOBACCO NON-USER: CPT | Performed by: NURSE PRACTITIONER

## 2023-10-10 PROCEDURE — G0008 ADMIN INFLUENZA VIRUS VAC: HCPCS | Performed by: NURSE PRACTITIONER

## 2023-10-10 PROCEDURE — 1170F FXNL STATUS ASSESSED: CPT | Performed by: NURSE PRACTITIONER

## 2023-10-10 RX ORDER — ROSUVASTATIN CALCIUM 5 MG/1
5 TABLET, COATED ORAL DAILY
Qty: 90 TABLET | Refills: 1 | Status: SHIPPED | OUTPATIENT
Start: 2023-10-10 | End: 2024-04-11 | Stop reason: SDUPTHER

## 2023-10-10 RX ORDER — LOSARTAN POTASSIUM 100 MG/1
100 TABLET ORAL DAILY
Qty: 90 TABLET | Refills: 3 | Status: SHIPPED | OUTPATIENT
Start: 2023-10-10 | End: 2024-04-11 | Stop reason: SDUPTHER

## 2023-10-10 RX ORDER — ROSUVASTATIN CALCIUM 10 MG/1
10 TABLET, COATED ORAL DAILY
Qty: 90 TABLET | Refills: 3 | Status: CANCELLED | OUTPATIENT
Start: 2023-10-10 | End: 2024-04-07

## 2023-10-10 RX ORDER — TRIAMTERENE/HYDROCHLOROTHIAZID 37.5-25 MG
1 TABLET ORAL 2 TIMES DAILY
Qty: 90 TABLET | Refills: 3 | Status: SHIPPED | OUTPATIENT
Start: 2023-10-10 | End: 2023-12-12 | Stop reason: SDUPTHER

## 2023-10-10 RX ORDER — METOPROLOL SUCCINATE 100 MG/1
100 TABLET, EXTENDED RELEASE ORAL 2 TIMES DAILY
Qty: 90 TABLET | Refills: 3 | Status: SHIPPED | OUTPATIENT
Start: 2023-10-10 | End: 2023-12-12 | Stop reason: SDUPTHER

## 2023-10-10 ASSESSMENT — ACTIVITIES OF DAILY LIVING (ADL)
GROCERY_SHOPPING: INDEPENDENT
DRESSING: INDEPENDENT
BATHING: INDEPENDENT
MANAGING_FINANCES: INDEPENDENT
TAKING_MEDICATION: INDEPENDENT
DOING_HOUSEWORK: INDEPENDENT

## 2023-10-10 ASSESSMENT — ANXIETY QUESTIONNAIRES
6. BECOMING EASILY ANNOYED OR IRRITABLE: NOT AT ALL
4. TROUBLE RELAXING: NOT AT ALL
5. BEING SO RESTLESS THAT IT IS HARD TO SIT STILL: NOT AT ALL
3. WORRYING TOO MUCH ABOUT DIFFERENT THINGS: NOT AT ALL
1. FEELING NERVOUS, ANXIOUS, OR ON EDGE: NOT AT ALL
2. NOT BEING ABLE TO STOP OR CONTROL WORRYING: NOT AT ALL
GAD7 TOTAL SCORE: 0
7. FEELING AFRAID AS IF SOMETHING AWFUL MIGHT HAPPEN: NOT AT ALL
IF YOU CHECKED OFF ANY PROBLEMS ON THIS QUESTIONNAIRE, HOW DIFFICULT HAVE THESE PROBLEMS MADE IT FOR YOU TO DO YOUR WORK, TAKE CARE OF THINGS AT HOME, OR GET ALONG WITH OTHER PEOPLE: NOT DIFFICULT AT ALL

## 2023-10-10 ASSESSMENT — ENCOUNTER SYMPTOMS
OCCASIONAL FEELINGS OF UNSTEADINESS: 0
LOSS OF SENSATION IN FEET: 0
DEPRESSION: 0

## 2023-10-10 ASSESSMENT — PATIENT HEALTH QUESTIONNAIRE - PHQ9
SUM OF ALL RESPONSES TO PHQ9 QUESTIONS 1 AND 2: 0
2. FEELING DOWN, DEPRESSED OR HOPELESS: NOT AT ALL
1. LITTLE INTEREST OR PLEASURE IN DOING THINGS: NOT AT ALL

## 2023-10-10 NOTE — PROGRESS NOTES
"Subjective   Reason for Visit: Michael Babb is an 68 y.o. male here for a Medicare Wellness visit.     Past Medical, Surgical, and Family History reviewed and updated in chart.    Reviewed all medications by prescribing practitioner or clinical pharmacist (such as prescriptions, OTCs, herbal therapies and supplements) and documented in the medical record.    Patient is a following up for lab results review and management of multiple chronic diseases.  His lab results are unremarkable.  Advises that he take his medications as prescribed with no side effect noted.  Advises that he has no acute medical complaint.        Patient Care Team:  JOSS Nava as PCP - General (Family Medicine)  JOSS Nava as PCP - [facility] PCP     Review of Systems   All other systems reviewed and are negative.      Objective   Vitals:  /64   Pulse 83   Resp 16   Ht 1.753 m (5' 9\")   Wt 94.8 kg (209 lb)   SpO2 99%   BMI 30.86 kg/m²       Physical Exam  Constitutional:       Appearance: Normal appearance. He is obese.   HENT:      Head: Normocephalic and atraumatic.      Right Ear: External ear normal.      Left Ear: External ear normal.      Nose: Nose normal.      Mouth/Throat:      Mouth: Mucous membranes are moist.   Cardiovascular:      Rate and Rhythm: Normal rate and regular rhythm.      Pulses: Normal pulses.      Heart sounds: Normal heart sounds.   Pulmonary:      Effort: Pulmonary effort is normal.      Breath sounds: Normal breath sounds.   Abdominal:      General: Bowel sounds are normal.      Palpations: Abdomen is soft.   Musculoskeletal:      Cervical back: Neck supple.   Skin:     General: Skin is warm and dry.      Capillary Refill: Capillary refill takes less than 2 seconds.   Neurological:      General: No focal deficit present.      Mental Status: He is alert and oriented to person, place, and time.   Psychiatric:         Mood and Affect: Mood normal.         Behavior: Behavior " normal.         Thought Content: Thought content normal.         Judgment: Judgment normal.     PHQ-9 score equals 0, which indicates negative depression.    Assessment/Plan   Problem List Items Addressed This Visit       Dyslipidemia (high LDL; low HDL)     Other Visit Diagnoses       Routine general medical examination at health care facility    -  Primary    Encounter for follow-up        Flu vaccine need

## 2023-10-10 NOTE — PATIENT INSTRUCTIONS
I have reduced your Rosuvastatin to 5mg daily. Continue taking all other medications as prescribed and complete labs a week prior to 6 months follow up.

## 2023-10-12 ENCOUNTER — TELEPHONE (OUTPATIENT)
Dept: PRIMARY CARE | Facility: CLINIC | Age: 68
End: 2023-10-12
Payer: MEDICARE

## 2023-10-12 DIAGNOSIS — Z12.12 SCREENING FOR COLORECTAL CANCER: Primary | ICD-10-CM

## 2023-10-12 DIAGNOSIS — Z12.11 SCREENING FOR COLORECTAL CANCER: Primary | ICD-10-CM

## 2023-10-12 NOTE — TELEPHONE ENCOUNTER
Patient had his last colonoscopy 1.15.2019 and needs to repeat it every 5 years, he will be due at the beginning of the year. Can you put a referral in? He'll call Monday and get it scheduled with Dr. Saleh

## 2023-11-17 DIAGNOSIS — Z96.652 HISTORY OF LEFT KNEE REPLACEMENT: ICD-10-CM

## 2023-11-17 DIAGNOSIS — M17.12 PRIMARY OSTEOARTHRITIS OF LEFT KNEE: ICD-10-CM

## 2023-11-21 ENCOUNTER — OFFICE VISIT (OUTPATIENT)
Dept: ORTHOPEDIC SURGERY | Facility: CLINIC | Age: 68
End: 2023-11-21
Payer: MEDICARE

## 2023-11-21 ENCOUNTER — HOSPITAL ENCOUNTER (OUTPATIENT)
Dept: RADIOLOGY | Facility: HOSPITAL | Age: 68
Discharge: HOME | End: 2023-11-21
Payer: MEDICARE

## 2023-11-21 DIAGNOSIS — Z96.652 HISTORY OF TOTAL LEFT KNEE REPLACEMENT: Primary | ICD-10-CM

## 2023-11-21 DIAGNOSIS — M17.12 PRIMARY OSTEOARTHRITIS OF LEFT KNEE: ICD-10-CM

## 2023-11-21 DIAGNOSIS — Z96.652 HISTORY OF LEFT KNEE REPLACEMENT: ICD-10-CM

## 2023-11-21 PROCEDURE — 1160F RVW MEDS BY RX/DR IN RCRD: CPT | Performed by: SPECIALIST

## 2023-11-21 PROCEDURE — 73562 X-RAY EXAM OF KNEE 3: CPT | Mod: LEFT SIDE | Performed by: RADIOLOGY

## 2023-11-21 PROCEDURE — 99213 OFFICE O/P EST LOW 20 MIN: CPT | Performed by: SPECIALIST

## 2023-11-21 PROCEDURE — 3008F BODY MASS INDEX DOCD: CPT | Performed by: SPECIALIST

## 2023-11-21 PROCEDURE — 1036F TOBACCO NON-USER: CPT | Performed by: SPECIALIST

## 2023-11-21 PROCEDURE — 1126F AMNT PAIN NOTED NONE PRSNT: CPT | Performed by: SPECIALIST

## 2023-11-21 PROCEDURE — 73560 X-RAY EXAM OF KNEE 1 OR 2: CPT | Mod: LEFT SIDE | Performed by: RADIOLOGY

## 2023-11-21 PROCEDURE — 73562 X-RAY EXAM OF KNEE 3: CPT | Mod: LT

## 2023-11-21 PROCEDURE — 1159F MED LIST DOCD IN RCRD: CPT | Performed by: SPECIALIST

## 2023-11-21 NOTE — PROGRESS NOTES
6 month follow up Left TKA 5/31/2023  Doing well just having some stiffness     The patient is here for follow-up of their left total knee arthroplasty.  The patient has minimal knee pain.  The patient has no mechanical symptoms.  The patient is approximately 6 months postop.    Patient denies constitutional symptoms, ROS otherwise negative    Physical examination:    Examination of the left knee  The incision is healed  Minimal erythema, ecchymosis, and warmth, appropriate for postoperative timeframe.  Minimal knee joint effusion, appropriate for timeframe.  Gait is intact without significant pathologic components  Range of motion: Full extension to 115 degrees of flexion  No significant knee instability in full extension and mid flexion.  Calf is soft, Homans negative  The patient has intact ankle dorsiflexion and plantarflexion.  Neurovascular exam otherwise within normal      Radiographs: X-rays reviewed, show a well aligned stable left total knee arthroplasty implant, negative effusion, negative implant bone lucencies.    Impression:  Status post left total  knee arthroplasty    Plan:  Continue home range of motion strengthening program will note ongoing improvement.  He can return to activities as tolerated.  Follow-up in 6 months for 1 year postop x-rays of the knee.  Discussed the continued importance of prophylactic dental antibiotics  All questions answered       This note was dictated using speech recognition software and was not corrected for spelling or grammatical errors

## 2023-11-21 NOTE — LETTER
November 21, 2023     Patient: Michael Babb   YOB: 1955   Date of Visit: 11/21/2023       To Whom It May Concern:    Patient was seen today, he must remain 8 hour work shifts a day starting on 1/29/2024 until 7/29/2024.    If you have any questions or concerns, please don't hesitate to call.         Sincerely,        Baldo Garnica MD    CC: No Recipients

## 2023-12-01 ENCOUNTER — TELEPHONE (OUTPATIENT)
Dept: PRIMARY CARE | Facility: CLINIC | Age: 68
End: 2023-12-01
Payer: MEDICARE

## 2023-12-08 DIAGNOSIS — N52.9 ERECTILE DYSFUNCTION, UNSPECIFIED ERECTILE DYSFUNCTION TYPE: Primary | ICD-10-CM

## 2023-12-08 RX ORDER — TADALAFIL 20 MG/1
20 TABLET ORAL DAILY PRN
Qty: 30 TABLET | Refills: 0 | Status: SHIPPED | OUTPATIENT
Start: 2023-12-08 | End: 2024-01-16

## 2023-12-12 ENCOUNTER — TELEPHONE (OUTPATIENT)
Dept: PRIMARY CARE | Facility: CLINIC | Age: 68
End: 2023-12-12
Payer: MEDICARE

## 2023-12-12 DIAGNOSIS — I10 BENIGN ESSENTIAL HYPERTENSION: ICD-10-CM

## 2023-12-12 RX ORDER — METOPROLOL SUCCINATE 100 MG/1
100 TABLET, EXTENDED RELEASE ORAL 2 TIMES DAILY
Qty: 90 TABLET | Refills: 3 | Status: SHIPPED | OUTPATIENT
Start: 2023-12-12 | End: 2024-04-11 | Stop reason: SDUPTHER

## 2023-12-12 RX ORDER — TRIAMTERENE/HYDROCHLOROTHIAZID 37.5-25 MG
1 TABLET ORAL 2 TIMES DAILY
Qty: 90 TABLET | Refills: 3 | Status: SHIPPED | OUTPATIENT
Start: 2023-12-12 | End: 2024-04-11 | Stop reason: SDUPTHER

## 2023-12-12 NOTE — TELEPHONE ENCOUNTER
Metoprolo 100 mg 90 day refill  Triamterne hydrochlorothiazide 37.5-25 mg tablets 90 day refill    [facility]     LOV: 10/10/23  NOV: 4/11/24

## 2023-12-18 ENCOUNTER — TELEPHONE (OUTPATIENT)
Dept: PRIMARY CARE | Facility: CLINIC | Age: 68
End: 2023-12-18
Payer: MEDICARE

## 2023-12-18 DIAGNOSIS — J06.9 ACUTE URI: Primary | ICD-10-CM

## 2023-12-18 RX ORDER — AZITHROMYCIN 250 MG/1
TABLET, FILM COATED ORAL
Qty: 6 TABLET | Refills: 0 | Status: SHIPPED | OUTPATIENT
Start: 2023-12-18 | End: 2023-12-23

## 2023-12-18 NOTE — TELEPHONE ENCOUNTER
Chief Complaint :     Symptoms: Cough, dry scratchy throat, runny nose    Duration: 1 week     Treatments: Kacey Deming Could and Flu    Patient Requesting: Laura     Did the Patient have the covid Vaccine:    Pharmacy: [facility]     Covid Tested: Has not tested

## 2024-01-03 NOTE — TELEPHONE ENCOUNTER
Patient still has a cough and runny nose, it has been a month. He said that the Z-ramon did not help, he is asking if you can call something else in that might help.

## 2024-01-12 ENCOUNTER — ANESTHESIA EVENT (OUTPATIENT)
Dept: GASTROENTEROLOGY | Facility: HOSPITAL | Age: 69
End: 2024-01-12
Payer: MEDICARE

## 2024-01-15 ENCOUNTER — HOSPITAL ENCOUNTER (OUTPATIENT)
Dept: GASTROENTEROLOGY | Facility: HOSPITAL | Age: 69
Discharge: HOME | End: 2024-01-15
Payer: MEDICARE

## 2024-01-15 ENCOUNTER — ANESTHESIA (OUTPATIENT)
Dept: GASTROENTEROLOGY | Facility: HOSPITAL | Age: 69
End: 2024-01-15
Payer: MEDICARE

## 2024-01-15 VITALS
TEMPERATURE: 97.1 F | RESPIRATION RATE: 15 BRPM | HEART RATE: 55 BPM | WEIGHT: 205 LBS | DIASTOLIC BLOOD PRESSURE: 83 MMHG | OXYGEN SATURATION: 95 % | HEIGHT: 69 IN | BODY MASS INDEX: 30.36 KG/M2 | SYSTOLIC BLOOD PRESSURE: 137 MMHG

## 2024-01-15 DIAGNOSIS — Z12.12 SCREENING FOR COLORECTAL CANCER: ICD-10-CM

## 2024-01-15 DIAGNOSIS — Z12.11 SCREENING FOR COLORECTAL CANCER: ICD-10-CM

## 2024-01-15 PROBLEM — G47.33 OSA (OBSTRUCTIVE SLEEP APNEA): Status: ACTIVE | Noted: 2019-04-11

## 2024-01-15 PROCEDURE — 88305 TISSUE EXAM BY PATHOLOGIST: CPT | Mod: TC,SUR,PORLAB | Performed by: INTERNAL MEDICINE

## 2024-01-15 PROCEDURE — 2500000005 HC RX 250 GENERAL PHARMACY W/O HCPCS: Performed by: NURSE ANESTHETIST, CERTIFIED REGISTERED

## 2024-01-15 PROCEDURE — 7100000009 HC PHASE TWO TIME - INITIAL BASE CHARGE: Performed by: INTERNAL MEDICINE

## 2024-01-15 PROCEDURE — 2500000004 HC RX 250 GENERAL PHARMACY W/ HCPCS (ALT 636 FOR OP/ED): Performed by: NURSE ANESTHETIST, CERTIFIED REGISTERED

## 2024-01-15 PROCEDURE — 45385 COLONOSCOPY W/LESION REMOVAL: CPT | Performed by: INTERNAL MEDICINE

## 2024-01-15 PROCEDURE — 3700000001 HC GENERAL ANESTHESIA TIME - INITIAL BASE CHARGE: Performed by: INTERNAL MEDICINE

## 2024-01-15 PROCEDURE — 7100000010 HC PHASE TWO TIME - EACH INCREMENTAL 1 MINUTE: Performed by: INTERNAL MEDICINE

## 2024-01-15 PROCEDURE — 88305 TISSUE EXAM BY PATHOLOGIST: CPT | Performed by: PATHOLOGY

## 2024-01-15 PROCEDURE — 3700000002 HC GENERAL ANESTHESIA TIME - EACH INCREMENTAL 1 MINUTE: Performed by: INTERNAL MEDICINE

## 2024-01-15 PROCEDURE — 2500000004 HC RX 250 GENERAL PHARMACY W/ HCPCS (ALT 636 FOR OP/ED): Performed by: INTERNAL MEDICINE

## 2024-01-15 RX ORDER — FENTANYL CITRATE 50 UG/ML
INJECTION, SOLUTION INTRAMUSCULAR; INTRAVENOUS AS NEEDED
Status: DISCONTINUED | OUTPATIENT
Start: 2024-01-15 | End: 2024-01-15

## 2024-01-15 RX ORDER — LIDOCAINE HYDROCHLORIDE 20 MG/ML
INJECTION, SOLUTION EPIDURAL; INFILTRATION; INTRACAUDAL; PERINEURAL AS NEEDED
Status: DISCONTINUED | OUTPATIENT
Start: 2024-01-15 | End: 2024-01-15

## 2024-01-15 RX ORDER — PROPOFOL 10 MG/ML
INJECTION, EMULSION INTRAVENOUS AS NEEDED
Status: DISCONTINUED | OUTPATIENT
Start: 2024-01-15 | End: 2024-01-15

## 2024-01-15 RX ORDER — SODIUM CHLORIDE 9 MG/ML
30 INJECTION, SOLUTION INTRAVENOUS CONTINUOUS
Status: DISCONTINUED | OUTPATIENT
Start: 2024-01-15 | End: 2024-01-16 | Stop reason: HOSPADM

## 2024-01-15 RX ADMIN — LIDOCAINE HYDROCHLORIDE 40 MG: 20 INJECTION, SOLUTION EPIDURAL; INFILTRATION; INTRACAUDAL; PERINEURAL at 12:36

## 2024-01-15 RX ADMIN — PROPOFOL 100 MG: 10 INJECTION, EMULSION INTRAVENOUS at 12:36

## 2024-01-15 RX ADMIN — PROPOFOL 50 MG: 10 INJECTION, EMULSION INTRAVENOUS at 12:43

## 2024-01-15 RX ADMIN — PROPOFOL 50 MG: 10 INJECTION, EMULSION INTRAVENOUS at 12:39

## 2024-01-15 RX ADMIN — PROPOFOL 50 MG: 10 INJECTION, EMULSION INTRAVENOUS at 12:47

## 2024-01-15 RX ADMIN — FENTANYL CITRATE 50 MCG: 50 INJECTION, SOLUTION INTRAMUSCULAR; INTRAVENOUS at 12:36

## 2024-01-15 RX ADMIN — SODIUM CHLORIDE: 9 INJECTION, SOLUTION INTRAVENOUS at 12:34

## 2024-01-15 RX ADMIN — FENTANYL CITRATE 50 MCG: 50 INJECTION, SOLUTION INTRAMUSCULAR; INTRAVENOUS at 12:39

## 2024-01-15 SDOH — HEALTH STABILITY: MENTAL HEALTH: CURRENT SMOKER: 0

## 2024-01-15 ASSESSMENT — PAIN SCALES - GENERAL
PAINLEVEL_OUTOF10: 0 - NO PAIN
PAIN_LEVEL: 0

## 2024-01-15 ASSESSMENT — COLUMBIA-SUICIDE SEVERITY RATING SCALE - C-SSRS
2. HAVE YOU ACTUALLY HAD ANY THOUGHTS OF KILLING YOURSELF?: NO
6. HAVE YOU EVER DONE ANYTHING, STARTED TO DO ANYTHING, OR PREPARED TO DO ANYTHING TO END YOUR LIFE?: NO
1. IN THE PAST MONTH, HAVE YOU WISHED YOU WERE DEAD OR WISHED YOU COULD GO TO SLEEP AND NOT WAKE UP?: NO

## 2024-01-15 ASSESSMENT — PAIN - FUNCTIONAL ASSESSMENT: PAIN_FUNCTIONAL_ASSESSMENT: 0-10

## 2024-01-15 NOTE — ANESTHESIA PREPROCEDURE EVALUATION
Patient: Michael Babb    Procedure Information       Date/Time: 01/15/24 1200    Scheduled providers: Pio Saleh MD    Procedure: COLONOSCOPY    Location: [facility]            Relevant Problems   Anesthesia (within normal limits)      Cardiovascular   (+) Benign essential hypertension      Endocrine (within normal limits)      GI (within normal limits)      /Renal   (+) Chronic hepatitis C virus infection (CMS/HCC)      Neuro/Psych   (+) Lumbago with sciatica, right side   (+) Right lumbar radiculopathy   (+) Situational anxiety      Pulmonary   (+) Dyspnea on exertion   (+) SHERMAN (obstructive sleep apnea)      GI/Hepatic   (+) Chronic hepatitis C virus infection (CMS/HCC)      Hematology (within normal limits)      Musculoskeletal   (+) Degeneration of intervertebral disc of lumbar region   (+) Lumbar spondylosis   (+) Primary osteoarthritis of left knee   (+) Primary osteoarthritis of right hip   (+) Spinal stenosis of lumbosacral region      Infectious Disease   (+) Chronic hepatitis C virus infection (CMS/HCC)       Clinical information reviewed:   Tobacco  Allergies  Meds   Med Hx  Surg Hx   Fam Hx  Soc Hx        NPO Detail:  NPO/Void Status  Date of Last Liquid: 01/14/24  Time of Last Liquid: 2300  Date of Last Solid: 01/13/24         Physical Exam    Airway  Mallampati: II  TM distance: >3 FB  Neck ROM: full     Cardiovascular - normal exam  Rhythm: regular     Dental - normal exam     Pulmonary - normal exam     Abdominal - normal exam         Anesthesia Plan    History of general anesthesia?: yes  History of complications of general anesthesia?: no    ASA 2     MAC     The patient is not a current smoker.    intravenous induction   Anesthetic plan and risks discussed with patient.

## 2024-01-15 NOTE — ANESTHESIA POSTPROCEDURE EVALUATION
Patient: Michael Babb    Procedure Summary       Date: 01/15/24 Room / Location: [facility]    Anesthesia Start: 1230 Anesthesia Stop: 1301    Procedure: COLONOSCOPY Diagnosis: Screening for colorectal cancer    Scheduled Providers: Pio Saleh MD Responsible Provider: IGNACIO Hollingsworth    Anesthesia Type: MAC ASA Status: 2            Anesthesia Type: MAC    Vitals Value Taken Time   /76 01/15/24 1304   Temp 36.4 °C (97.5 °F) 01/15/24 1304   Pulse 56 01/15/24 1304   Resp 16 01/15/24 1304   SpO2 98 % 01/15/24 1304       Anesthesia Post Evaluation    Patient location during evaluation: bedside  Patient participation: complete - patient participated  Level of consciousness: awake and alert  Pain score: 0  Pain management: adequate  Airway patency: patent  Cardiovascular status: acceptable  Respiratory status: acceptable  Hydration status: acceptable  Postoperative Nausea and Vomiting: none    There were no known notable events for this encounter.

## 2024-01-15 NOTE — H&P
History Of Present Illness  Michael Babb is a 68 y.o. male presenting for colon polyp surveillance examination.  His family history is notable for colon cancer in his son..  At his last colonoscopy in January 2019, I removed 2 small hyperplastic polyps.     Past Medical History  He has a past medical history of Hyperlipidemia, Hypertension, Personal history of other diseases of male genital organs, Personal history of other diseases of the circulatory system, and Personal history of other diseases of the nervous system and sense organs.    Surgical History  He has a past surgical history that includes Other surgical history (11/07/2018); Other surgical history (11/07/2018); and Other surgical history (11/07/2018).     Social History  He reports that he has never smoked. He has never used smokeless tobacco. He reports that he does not currently use alcohol. He reports that he does not currently use drugs.    Family History  Family History   Problem Relation Name Age of Onset    Colon cancer Son          without distant metastasis        Allergies  Lisinopril    Review of Systems  Constitutional: no fever, no chills, fatigue,  not feeling tired and no recent weight loss. No reports of dizziness.  SKIN: No skin rash or lesions  EYE: No pain photophobia, diplopia or blurred vision  EAR: No discharge, pain or hearing loss  NOSE: No congestion, epistaxis or obstruction  RESPIRATORY: No cough , dyspnea or  chest pain   CV: No chest pain, lower extremity swelling or palpitations  GE: No abdominal pain, nausea, vomiting, dysphagia or odynophagia. Denied constipation , diarrhea  : No dysuria or hematuria, urinary incontinence or urine frequency  NEURO: Denied weakness, confusion, dizziness, or numbness   PSYCH : Denies anxiety, hallucinations or insomnia  HEME/ LYMPH : Denied bleeding , bruising or enlarged nodes  ENDOCRINE: No change in weight, polydipsia, or abnormal bleeding  .        Physical Exam  Head and neck  "examinations were  unremarkable. There was no temporal wasting. No jaundice noted. No thyromegaly.  No carotid bruits.  There is no peripheral lymphadenopathy.  Lungs were clear to auscultation. There when no rales, rhonchi or wheezes.  Cardiac examination revealed normal heart sounds. Rhythm was sinus . There were no murmurs.  Abdomen was full and soft. There was no organomegaly. Bowel sounds were normo- active. There was no ascites. The abdomen was nontender.  Rectal examination was not done.  Extremities: No edema or joint swelling.  Neurological examination: Patient was alert, oriented in person place, and time. There when no focal neurological signs. Cranial nerves were grossly intact. No evidence of encephalopathy. There was no asterixis. The  plantar response was flexor.       Last Recorded Vitals  Blood pressure 143/78, pulse 70, temperature 36.2 °C (97.2 °F), temperature source Temporal, resp. rate 16, height 1.753 m (5' 9\"), weight 93 kg (205 lb), SpO2 99 %.    Relevant Results        None     Assessment/Plan  68-year-old man with history of colon cancer in his son.  At his colonoscopy in 2019 I removed 2 small hyperplastic polyp.  He returns now for surveillance examination.  Will proceed with colonoscopy as planned.    Pio Saleh MD  "

## 2024-01-19 LAB
LABORATORY COMMENT REPORT: NORMAL
PATH REPORT.FINAL DX SPEC: NORMAL
PATH REPORT.GROSS SPEC: NORMAL
PATH REPORT.RELEVANT HX SPEC: NORMAL
PATH REPORT.TOTAL CANCER: NORMAL

## 2024-01-30 ENCOUNTER — TELEPHONE (OUTPATIENT)
Dept: PRIMARY CARE | Facility: CLINIC | Age: 69
End: 2024-01-30
Payer: MEDICARE

## 2024-02-07 ENCOUNTER — TELEPHONE (OUTPATIENT)
Dept: GASTROENTEROLOGY | Facility: CLINIC | Age: 69
End: 2024-02-07
Payer: MEDICARE

## 2024-02-13 ENCOUNTER — TELEPHONE (OUTPATIENT)
Dept: PRIMARY CARE | Facility: CLINIC | Age: 69
End: 2024-02-13
Payer: MEDICARE

## 2024-02-13 DIAGNOSIS — G47.33 OSA (OBSTRUCTIVE SLEEP APNEA): Primary | ICD-10-CM

## 2024-02-15 NOTE — TELEPHONE ENCOUNTER
Patient called to get results of his Colonoscopy Pathology. Dr. Saleh is out of the office can you please review and advise? Thank You!!

## 2024-02-15 NOTE — TELEPHONE ENCOUNTER
2 polyps: 1 precancerous, 1 benign. Repeat colonoscopy 5 years as previously recommended by Dr. Saleh.

## 2024-03-04 ENCOUNTER — OFFICE VISIT (OUTPATIENT)
Dept: DERMATOLOGY | Facility: CLINIC | Age: 69
End: 2024-03-04
Payer: MEDICARE

## 2024-03-04 DIAGNOSIS — L82.1 SEBORRHEIC KERATOSIS: ICD-10-CM

## 2024-03-04 PROCEDURE — 1036F TOBACCO NON-USER: CPT | Performed by: SPECIALIST

## 2024-03-04 PROCEDURE — 1126F AMNT PAIN NOTED NONE PRSNT: CPT | Performed by: SPECIALIST

## 2024-03-04 PROCEDURE — 88305 TISSUE EXAM BY PATHOLOGIST: CPT | Performed by: DERMATOLOGY

## 2024-03-04 PROCEDURE — 3008F BODY MASS INDEX DOCD: CPT | Performed by: SPECIALIST

## 2024-03-04 PROCEDURE — 1159F MED LIST DOCD IN RCRD: CPT | Performed by: SPECIALIST

## 2024-03-04 PROCEDURE — 11300 SHAVE SKIN LESION 0.5 CM/<: CPT | Performed by: SPECIALIST

## 2024-03-04 NOTE — PROGRESS NOTES
Subjective   HPI: Micahel Babb is a 68 y.o. male is here for evaluation and treatment Suspicious Skin Lesion on Right Breast.    ROS: No other skin or systemic complaints other than what is documented elsewhere in the note.    ALLERGIES: Lisinopril    SOCIAL:  reports that he has never smoked. He has never used smokeless tobacco. He reports that he does not currently use alcohol. He reports that he does not currently use drugs.    Objective     Description: Patient has an irritated lesion of photodamaged area right breast.  Patient has a similar lesion right lower breast.  Patient also has another irritated lesion right areola.  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  1. Seborrheic keratosis (3)  Right Breast; Right Breast -lower; Right Areola    Skin biopsy - Right Breast -lower    Specimen 1 - Dermatopathology- DERM LAB  Differential Diagnosis: ISK   Check Margins Yes/No?:    Comments:    Dermpath Lab: Routine Histopathology (formalin-fixed tissue)    Specimen 2 - Dermatopathology- DERM LAB  Differential Diagnosis: ISK   Check Margins Yes/No?:    Comments:    Dermpath Lab: Routine Histopathology (formalin-fixed tissue)    Specimen 3 - Dermatopathology- DERM LAB  Differential Diagnosis: ISK   Check Margins Yes/No?:    Comments:    Dermpath Lab: Routine Histopathology (formalin-fixed tissue)       Plan: Shave excision of 3 irritated seborrheic keratosis versus squamous cell carcinomas.        FOLLOW UP: 2 weeks for further excisions and biopsy results.    The patient was encouraged to contact me with any further questions or concerns.  Ian Price MD  3/4/2024

## 2024-03-06 LAB
LABORATORY COMMENT REPORT: NORMAL
PATH REPORT.FINAL DX SPEC: NORMAL
PATH REPORT.GROSS SPEC: NORMAL
PATH REPORT.MICROSCOPIC SPEC OTHER STN: NORMAL
PATH REPORT.RELEVANT HX SPEC: NORMAL
PATH REPORT.TOTAL CANCER: NORMAL

## 2024-03-14 NOTE — PATIENT INSTRUCTIONS
[facility] [place] [facility]  [address]  [facility]  [address]  [facility] [id number]       Thank you for coming to the Sleep Medicine Clinic today! Your sleep medicine provider today was: JOSS Bustamante Below is a summary of your treatment plan, patient education, other important information, and our contact numbers.    Dear Michael Babb,    Thank you for visiting  Sleep Medicine Clinic !     1.You are doing great, please keep up the good work.    2. Please do not drive when you are sleepy and  start practicing the sleep hygiene  as discussed in clinic today.     3. Please start using Biotene gel to ease your dry mouth if needed.    4. Your blood pressure was elevated in the office today. Please obtain a home blood pressure monitoring kit and log your blood pressures at home and follow up with your primary care physician.    5. FOR QUESTIONS AND CONCERNS:   Please call my office with issues or questions: [phone] ([place]); [phone] ([facility])    If you have a CPAP or BiPAP machine at home, please bring the unit and all accessories including the power cord to your appointments unless I tell you otherwise. Please have knowledge of the DME company you worked with to receive your PAP device. If you have copies of any previous sleep testing completed outside of , please bring with you to clinic as well. This information will make our visits more productive.     If you are new to CPAP or BiPAP, please note the minimum usage insurance requires to continuing coverage for the equipment as noted by your DME company. Please discuss equipment issues (PAP unit, mask fit, humidification, etc.) with your DME company first.       In the event that you are running more than 10 minutes late to your appointment, I will kindly ask you to reschedule. Thanks.      TREATMENT PLAN     Follow-up Appointment:   ASAP    PATIENT EDUCATION      Obstructive Sleep Apnea (SHERMAN) is a sleep disorder where your upper airway muscles relax during sleep and the airway intermittently and repetitively narrows and collapses leading to partially blocked airway (hypopnea) or completely blocked airway (apnea) which, in turn, can disrupt breathing in sleep, lower oxygen levels while you sleep and cause night time wakings. Because both apnea and hypopnea may cause higher carbon dioxide or low oxygen levels, untreated SHERMAN can lead to heart arrhythmia, elevation of blood pressure, and make it harder for the body to consolidate memory and facilitate metabolism (leading to higher blood sugars at night). Frequent partial arousals occur during sleep resulting in sleep deprivation and daytime sleepiness. SHERMAN is associated with an increased risk of cardiovascular disease, stroke, hypertension, and insulin resistance. Moreover, untreated SHERMAN with excessive daytime sleepiness can increase the risk of motor vehicular accidents.    Some conservative strategies for SHERMAN regardless of SHERMAN severity are:   Positional therapy - Avoid sleeping on your back.   Healthy diet and regular exercise to optimize weight is highly encouraged.   Avoid alcohol late in the evening and sedative-hypnotics as these substances can make sleep apnea worse.   Improve breathing through the nose with intranasal steroid spray, saline rinse, or antihistamines    Safety: Avoid driving vehicle and operating heavy equipment while sleepy. Drowsy driving may lead to life-threatening motor vehicle accidents. A person driving while sleepy is 5 times more likely to have an accident. If you feel sleepy, pull over and take a short power nap (sleep for less than 30 minutes). Otherwise, ask somebody to drive you.    Treatment options for sleep apnea include weight management, positional therapy, Positive Airway Therapy (PAP) therapy, oral appliance therapy, hypoglossal nerve stimulator (Inspire) and select airway  surgeries.    Starting Positive Airway Pressure (PAP): You were ordered a device to wear when you sleep called PAP (Positive Airway Pressure) to treat your sleep apnea. The order will be submitted to a durable medical equipment (DME) company who will arrange setting you up with the device. They will provide all the necessary equipment and discuss use and maintenance of the device with you as well as mask fitting and process of replacing / renewing PAP supplies or accessories. Once you get the machine, please start using it immediately. You may not be successful right away and that is okay. Jodee be certain that you keep trying nightly and reach out to DME if you are struggling or having issues with machine usage.     *Please follow-up with me in 1-2 months of starting CPAP to see how well it is working for you and to do some troubleshooting if needed. Also, please bring all PAP equipment with you to follow up appointments unless told otherwise.     Important things to keep in mind as you start PAP:  Insurance will monitor your usage during the first 90 days. You should use your PAP - all night, every night, and including all naps (especially if naps are more than 30 minutes) for your health. The bare minimum is to use your PAP device while sleeping for at least 4 hours per day at least 5-6 days per week.. Otherwise, your PAP device will be reclaimed by your DME company at 90 days.  There are many masks to choose from to wear with your PAP machine. If you are not comfortable with the first mask issued to you, call your DME company and ask for another option to try. You typically have a 30-day mask guarantee from the day you received your machine.   Discuss with your provider if you are having issues breathing with the machine or if the temperature or humidity feel uncomfortable.  Expect to have an adjustment period when you start your device. It helps to continuing wearing the machine every day for a period of time  until you get more used to it. You can practice with wearing the mask alone if you need, then add in the PAP air pressure a few days later.   Reach out for help if you are struggling! The sleep medicine department can be reached at [phone]  We encourage you to download data monitoring apps to your phone. For [employer] AirSense 10/11 - MyAir erika. For [employer] - DreamMapper. Both apps are available in the Erika store for free and are a great tool to monitor your progress with your PAP device night to night.    Tips for success with PAP machine usage:  Comfortable and well-fitting mask  Appropriate pressure on the machine  Using humidification  Support from bed partner and clinical team      Maintaining your CPAP/BPAP device:    The humidification chamber (aka water tank or water chamber) needs to be filled with distilled water to prevent buildup of white deposits in the future. If you cannot find distilled water, you can use tap water but expect to have white deposits buildup seen after prolonged use with tap water. If you start seeing white deposits on the water chamber, you can clean it by filling it with equal parts of distilled white vinegar and water. Let the vinegar-water mixture sit for 2 hours, and then rinse it with running tap water. Clean with soap and water then let it dry.     You should try to keep your machine clean in order to work well. Here are some tips to clean PAP supplies / accessories:    Clean the humidification chamber (aka water tank) as well as your mask and tubing at least once a week with soap and water.   Alternatively, you can fill a sink or basin with warm water and add a little mild detergent, like Ivory dish soap. Gently wipe your supplies with the soapy water to free all the oils and dirt that may have collected. Once that's done, rinse these items with clean water until the soap is gone and let them air dry. You can hang your tubing over the curtain tim in your  bathroom so that it dries.  The mask insert (part of the mask that has contact with your skin) needs to be cleaned with soap and water daily. Another option is to wipe them down with CPAP wipes or baby wipes.    You should replace your mask and tubing frequently in order to prevent bacteria buildup, machine damage, and mask seal issues. The older the mask and hose, the high likelihood that there is bacteria buildup in it especially if they are not cleaned regularly. Dirty filters damage machines because build-up of dust and contaminants can cause machine to over-heat, and in time, damage the motor of machine. Cushions lose their seal over time as most masks are made of plastic and silicone while headgear is made of neoprene. These materials will break down with age and frequent use. Here is the recommended replacement schedule for PAP supplies / accessories:    Twice a month- disposable filters and cushions for nasal mask or nasal pillows.  Once a month- cushion for full face mask  Every 3 months- mask with headgear and PAP tubing (standard or heated hose)  Every 6 months- reusable filter, water chamber, and chin strap     Other useful information:    Some people do not put water in the tank while other people prefers to put water in the tank to prevent mouth dryness. Try to experiment to determine which is more comfortable for you.   In general, new machines have 2 years warranty on parts while health insurance allows you to have a new machine once every 5 years.     Common issues with PAP machine:    Mask gets dislodged when turning to the side: Consider getting a CPAP pillow or switching to a mask with hose on top.     Dry mouth:  Your machine has built-in humidifier that heats up the air to prevent dry mouth. It can be adjusted to your comfort. You can try that first and increase setting one level one night at a time to check which setting is comfortable and effective in lessening dry mouth. In some patients  "with heated hose, adjusting tube temperature to make air warmer can improve dry mouth. If dry mouth persists despite adjusting humidity or tube temperature setting, may apply OTC Biotene gel over the gums at bedtime.  If Biotene gel is not effective, consider trying XEROSTOM gel from [employer].  Also, eliminate or reduce dose of medications that can cause dry mouth if possible. Lastly, may try getting a separate room humidifier machine.    Airleaks: Please call DME as they may need to adjust your mask or refit you with a different kind or different size of mask. In addition, you can ask DME for tips on getting a good mask seal and mask fit.     Difficulty tolerating the mask: Contact your DME to try a different kind of mask and/or call office to get a referral to Sleep Psychologist for CPAP desensitization. CPAP desensitization technique is a set of strategies that helps patient cope with claustrophobia and anxiety related to wearing mask. Alternatively, we can do a daytime mini-sleep study called PAP-nap trial wherein you will try on different kinds of mask and the sleep technician will try different pressure settings on CPAP and BPAP machines to see which specific pressure is tolerable and comfortable for you.     Water droplets or moisture within the hose and/or mask: This is called rain-out and it is caused by condensation of too much heated humidity on the cooler walls of the hose. If you have rain-out, turn down humidity settings or get a heated hose. If you already have a heated hose, turn up the \"tube temperature\" of the heated hose. Alternatively, if you don't want to get a heated hose or warmer air, may wrap the CPAP hose with stockings to keep it somewhat warm. Also, you need to place the machine on the floor and lower the hose so that water won't travel upward towards your mask.     PAP desensitization techniques: If you have concerns about something being on your face at night, you can start by getting " used to it before trying to sleep with it as follows:      Sit in a comfortable chair or bed. Connect the mask and hose to the CPAP/BiPAP machine. Hold the mask on your face (without straps on) and turn on the machine. Practice breathing with the mask on while awake sitting and watching television, reading, or performing a sedentary activity during the day for 5-10 minutes and then take it off.  If tolerated, try again and gradually build up to longer periods of time. If not tolerated, try and try again until it is more comfortable as you become more desensitized. If you are able to use it for at least 20-30 minutes, move unto the next step.     Sit in a comfortable chair or bed. Connect the mask and hose to the CPAP/BiPAP machine. Strap the mask on your head and turn on the machine. Practice breathing with the mask and headgear on while awake sitting and watching television, reading, or performing a sedentary activity. Start with 5-10 minutes and gradually increasing time until you can wear it comfortably for at least 20-30 minutes, then move to the next step.    Take a shorter daytime nap with machine turned on while you are in a reclined position in bed, sofa, or recliner. Start with 5-10 minute nap and gradually increase up to 30 minutes. It is not important whether you fall asleep or not. The goal is to rest comfortably with PAP machine on.     Reintroduce PAP machine into nighttime sleep. You can begin using it a portion of the night and gradually increase up to entire night.     Proceed from one step to the next only when you are completely comfortable. If you feel any anxiety or discomfort, return to the previous step, then proceed again when comfortable.    Expect to “work” with your CPAP/BIPAP unit. It is important to try to relax when beginning CPAP/BIPAP therapy. Inhalation and exhalation should occur through the nose only. If you are unable to consistently breathe this way, do not panic or lose hope.  There are other types of masks which allow you to breathe through your nose and/or your mouth. Also, in some patients, using intranasal steroid spray (e.g. Flonase or Nasocort or Fluticasone) 1 hour before bedtime and/or before putting on CPAP mask can help tolerate breathing through the mask.    Don't give up after a few attempts--some patients adjust quickly, while some patients need 3-4 weeks (or sometimes even longer) to be accustomed to CPAP therapy.  Contact your sleep medicine specialist if you have a significant change in weight since this may affect your pressure.    Annual Reminders About Your Sleep Apnea    Your sleep apnea is well controlled based on reviewing your PAP Data Report.     General Reminders:  Continue current machine settings. Continue using machine every night. Need at least 4 hours daily usage.   Remember to clean your mask, tubings, and water chamber regularly as instructed.   Know the replacement schedule of your supplies/ accessories and contact your DME (durable medical equipment) provider if you are due for them.   Avoid driving or operating heavy machinery when drowsy. A person driving while sleepy is 5 times more likely to have an accident. If you feel sleepy, pull over and take a short power nap (sleep for less than 30 minutes). Otherwise, ask somebody to drive you.    Follow-up sooner through MyChart or calling our office if you have any of the following symptoms:  Snoring or stopping breathing while using the machine  Recurrence of fatigue, sleepiness, insomnia, and other symptoms you had prior using machine  Persistent or worsening fatigue or sleepiness despite regular use of machine  Issues tolerating the machine like bloating sensation, air hunger, too much hot air, too much pressure, taking off mask without recall in the middle of sleep, etc.     Other conservative measures to improve sleep apnea:  Losing weight can lead to some improvement of SHERMAN which means you will need  lower pressures in machine to control your SHERMAN. In some patients, they don't need to use the machine after bariatric surgery. Hence, consider medical and/or surgical weight loss especially if your BMI is more than 35.  Avoiding alcohol, sedative-hypnotics, and sedating medications is imperative as these substances can worsen snoring and sleep apnea  If you have nasal congestion or seasonal allergies, improving your breathing through the nose is critical for treating sleep apnea, tolerating CPAP, and improving your sleep; hence, using intranasal steroid spray like Flonase might help. Make sure you know the proper way to use it.  Stay off your back when sleeping.    Common issues with PAP machine:  Mask gets dislodged when turning to the side: Consider getting a CPAP pillow or switching to a mask with hose on top.     Dry mouth:  Your machine has built-in humidifier that heats up the air to prevent dry mouth. It can be adjusted to your comfort. You can try that first and increase setting one level one night at a time to check which setting is comfortable and effective in lessening dry mouth. In some patients with heated hose, adjusting tube temperature to make air warmer can improve dry mouth. If dry mouth persists despite adjusting humidity or tube temperature setting, may apply OTC Biotene gel over the gums at bedtime.  If Biotene gel is not effective, consider trying XEROSTOM gel from [employer].  Also, eliminate or reduce dose of medications that can cause dry mouth if possible. Lastly, may try getting a separate room humidifier machine.    Airleaks: Please call DME as they may need to adjust your mask or refit you with a different kind or different size of mask. In addition, you can ask DME for tips on getting a good mask seal and mask fit.     Difficulty tolerating the mask: Contact your DME to try a different kind of mask and/or call office to get a referral to Sleep Psychologist for CPAP desensitization. CPAP  "desensitization technique is a set of strategies that helps patient cope with claustrophobia and anxiety related to wearing mask. Alternatively, we can do a daytime mini-sleep study called PAP-nap trial wherein you will try on different kinds of mask and the sleep technician will try different pressure settings on CPAP and BPAP machines to see which specific pressure is tolerable and comfortable for you.     Water droplets or moisture within the hose and/or mask: This is called rain-out and it is caused by condensation of too much heated humidity on the cooler walls of the hose. If you have rain-out, turn down humidity settings or get a heated hose. If you already have a heated hose, turn up the \"tube temperature\" of the heated hose. Alternatively, if you don't want to get a heated hose or warmer air, may wrap the CPAP hose with stockings to keep it somewhat warm. Also, you need to place the machine on the floor and lower the hose so that water won't travel upward towards your mask.     Maintaining your CPAP/BPAP device:    The humidification chamber (aka water tank or water chamber) needs to be filled with distilled water to prevent buildup of white deposits in the future. If you cannot find distilled water, you can use tap water but expect to have white deposits buildup seen after prolonged use with tap water. If you start seeing white deposits on the water chamber, you can clean it by filling it with equal parts of distilled white vinegar and water. Let the vinegar-water mixture sit for 2 hours, and then rinse it with running tap water. Clean with soap and water then let it dry.     You should try to keep your machine clean in order to work well. Here are some tips to clean PAP supplies / accessories:    Clean the humidification chamber (aka water tank) as well as your mask and tubing at least once a week with soap and water.   Alternatively, you can fill a sink or basin with warm water and add a little mild " detergent, like Ivory dish soap. Gently wipe your supplies with the soapy water to free all the oils and dirt that may have collected. Once that's done, rinse these items with clean water until the soap is gone and let them air dry. You can hang your tubing over the curtain tim in your bathroom so that it dries.  The mask insert (part of the mask that has contact with your skin) needs to be cleaned with soap and water daily. Another option is to wipe them down with CPAP wipes or baby wipes.    You should replace your mask and tubing frequently in order to prevent bacteria buildup, machine damage, and mask seal issues. The older the mask and hose, the high likelihood that there is bacteria buildup in it especially if they are not cleaned regularly. Dirty filters damage machines because build-up of dust and contaminants can cause machine to over-heat, and in time, damage the motor of machine. Cushions lose their seal over time as most masks are made of plastic and silicone while headgear is made of neoprene. These materials will break down with age and frequent use. Here is the recommended replacement schedule for PAP supplies / accessories:    Twice a month- disposable filters and cushions for nasal mask or nasal pillows.  Once a month- cushion for full face mask  Every 3 months- mask with headgear and PAP tubing (standard or heated hose)  Every 6 months- reusable filter, water chamber, and chin strap     Other useful information:    Some people do not put water in the tank while other people prefers to put water in the tank to prevent mouth dryness. Try to experiment to determine which is more comfortable for you.   In general, new machines have 2 years warranty on parts while health insurance allows you to have a new machine once every 5 years.     You can also go to the following EDUCATION WEBSITES for further information:   American Academy of Sleep Medicine http://sleepeducation.org  National Sleep Foundation:  https://sleepfoundation.org  American Sleep Apnea Association: https://www.sleepapnea.org (for patients with sleep apnea)  Narcolepsy Network: https://www.narcolepsynetwork.org (for patients with narcolepsy)  [employer]: https://www.Oreg[employer] Biotechcolepsy.org (for patients with narcolepsy)  Hypersomnia Foundation: https://www.hypersomniafoundation.org (for patients with idiopathic hypersomnia)  RLS foundation: https://www.rls.org (for patients with restless leg syndrome)    IMPORTANT INFORMATION     Call 911 for medical emergencies.  Our offices are generally open from Monday-Friday, 8 am - 5 pm.   There are no supporting services by either the sleep doctors or their staff on weekends and Holidays, or after 5 PM on weekdays.   If you need to get in touch with me, you may either call my office number or you can use wiMAN.  If a referral for a test, for CPAP, or for another specialist was made, and you have not heard about scheduling this within a week, please call scheduling at [phone] ([phone]).  If you are unable to make your appointment for clinic or an overnight study, kindly call the office or sleep testing center at least 48 hours in advance to cancel and reschedule.  If you are on CPAP, please bring your device's card and/or the device to each clinic appointment.   In case of problems with PAP machine or mask interface, please contact your [employer] (Durable Medical Equipment) company first. [employer] is the company who provides you the machine and/or PAP supplies.       PRESCRIPTIONS     We require 7 days advanced notice for prescription refills. If we do not receive the request in this time, we cannot guarantee that your medication will be refilled in time.    IMPORTANT PHONE NUMBERS     Sleep Medicine Clinic Fax: [phone]  Appointments (for Pediatric Sleep Clinic): [phone] ([phone] - option 1  Appointments (for Adult Sleep Clinic): [phone] ([phone]) - option 2  Appointments (For Sleep Studies):  [phone] ([phone] - option 3  Behavioral Sleep Medicine: [phone]  Sleep Surgery: [phone]  Nutrition Service: [phone]  Weight management clinics with endocrinology: [phone]  Bariatric Services: [phone] (includes weight loss medications and weight loss surgery)  [facility]: [phone] (offers holistic approaches to weight management)  ENT (Otolaryngology): [phone]  Headache Clinic (Neurology): [phone]  Neurology: [phone]  Psychiatry: [phone]  Pulmonary Function Testing (PFT) Center: [phone]  Pulmonary Medicine: [phone]  [facility] ([place]): [phone]      OUR SLEEP TESTING LOCATIONS     Our team will contact you to schedule your sleep study, however, you can contact us as follow:  Main Phone Line (scheduling only): [phone] ([phone]), option 3  Adult and Pediatric Locations  [facility] (6 years and older): [facility] - 4th floor ([facility]) After hours line: [phone]  [facility] (Main campus: All ages): [facility], 6th floor. After hours line: [phone]   [place] (5 years and older; younger considered on case-by-case basis): [address]; [address], [address]. Scheduling  After hours line: [phone]   [place] (6 years and older): [address]; [address]; [address]   [place] (6 years and older): [address] [facility], [address]  After hours line: [phone]   [place] (13 years and older) in [place]: [address], 2nd floor  After hours line: [phone]   [place] (13 year and older): [address]  After hours line: [phone]     Adult Only Locations:   [place] (18 years and older): [address] [facility], 2nd floor   [place] (18 years and older): [address]; 4th floor  After hours line: [phone]   [place] (18 years and older) at [place]: [address]  "[facility]  After hours line: [phone]     CONTACTING YOUR SLEEP MEDICINE PROVIDER AND SLEEP TEAM      For issues with your machine or mask interface, please call your DME provider first. [employer] stands for durable medical company. [employer] is the company who provides you the machine and/or PAP supplies / accessories.   To schedule, cancel, or reschedule SLEEP STUDY APPOINTMENTS, please call the Main Phone Line at [phone] - option 3.   To schedule, cancel, or reschedule CLINIC APPOINTMENTS, you can do it in \"[employer]\", call [phone] to speak with my  (Juliet Pham), or call the Main Phone Line at [phone] - option 2  For CLINICAL QUESTIONS or MEDICATION REFILLS, please call direct line for Adult Sleep Nurses at [phone].   Lastly, you can also send a message directly to your provider through \"My Chart\", which is the email service through your  Records Account: [email or web address]       Here at [facility], we wish you a restful sleep!   "

## 2024-03-14 NOTE — PROGRESS NOTES
[facility]  POR [address]  [facility]  [address]  [facility] [address]     [facility]  New Visit Note      Subjective   Patient ID: Michael Babb is a 68 y.o. male with past medical history significant for Obesity, Hypertension, Anxiety, and Obstructive sleep apnea.    3/19/2024: The Patient is alone today for a comprehensive sleep medicine evaluation due to sleep apnea on pap. Today, he came to the office to ask for another pap because he has faithfully used pap and needs a new machine. He has a provider at [facility]. However, the provider moves out and needs to find another provider to establish care. He recently felt an extremely dry mouth when he woke up and required more pressure to wake him up at night. ESS: 0 JAVIER: 10, and neck circumference is 16 inches today.     HPI  The Patient had been having these symptoms for the past 25 years. The Patient was diagnosed with SHERMAN in 2019 by [facility] and has started on CPAP since then. Currently on auto-CPAP 15 cm H2O with EPR/Flex 1 and nasal pillow mask through [facility]. The Patient has been using the machine every night.     SLEEP STUDY HISTORY: (personally reviewed raw data such as interpretation report, data sheet, hypnogram, and titration table if available and applicable)  4/16/2019: pap titration: AHI : 82.7, Guanako: 77%,    SLEEP-WAKE SCHEDULE  Bedtime: 10 PM on weekdays, 11 PM on weekends  Subjective sleep latency: 10 minutes  Problems falling asleep: no  Number of awakenings: 2-3 times per night spontaneously for urination  Falls back asleep in 10 minutes  Problems staying asleep: no  Final wake time: 5:30 AM on weekdays, 7 AM on weekends  Out of bed time: 5:40 AM on weekdays, 7 AM on weekends  Shift work: no  Naps: no  Average sleep duration (excluding naps): 7 hours    SLEEP ENVIRONMENT  Sleep location: bed  Sleep status: sleeps with girl friend  Room is dark:   Yes  Room is quiet: Yes  Room is cool: Yes  Bed comfort: good    SLEEP HABITS:   Activities before bedtime: watch TV  Activities in bed: no   Preferred sleep position: back    SLEEP ROS:  Night symptoms: POSITIVE for snoring before PAP but not now and mouth breathing  Morning symptoms: POSITIVE for unrefreshing sleep and morning dry mouth  Daytime symptoms POSITIVE for excessive daytime sleepiness before PAP but not now  Hypersomnia / narcolepsy symptoms: Patient denies symptoms of a hypersomnolence disorder such as sleep paralysis, sleep-related hallucinations, recurrent sleep attacks, automatic behaviors, and cataplexy.   RLS symptoms: Patient denies RLS symptoms.  Movements in sleep: Patient denies problematic movements in sleep.  Parasomnia symptoms: Patient denies symptoms of parasomnia.    WEIGHT: stable    REVIEW OF SYSTEMS: All other systems have been reviewed and are negative.    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 No significant changes unless documented in the pertinent chart section. Updates made when necessary.       Objective     Vitals:    03/19/24 1559   BP: 149/75   Pulse: 55   Resp: 16   Temp: 36.7 °C (98.1 °F)   SpO2: 97%      Physical Exam  Constitutional:alert and oriented to time, place, and person  Sinus: - tenderness to palpation  Palate: Narrow Mallampati 3, + Tongue scalloping, + macroglossia  Lungs: Clear to auscultation bilateral, no rales  Heart: Regular rate and rhythm, no murmurs    Assessment/Plan   Michael Babb is a 68 y.o. male who is seen to evaluate for severe obstructive sleep apnea. Risk factors of sleep apnea as well  as cardiometabolic and neurocognitive sequelae associated with untreated sleep apnea were also discussed. Lastly, patient was advised to avoid driving vehicle or operating heavy machinery when sleepy.     Michael Babb with the following problems:     # OBSTRUCTIVE SLEEP APNEA:  -Provide a sample of N30i mask, he reports comfortable. Will order annual supplies next visit.  -Continue 15 cmH20, and instructed him to bring the pap for next visit.   -Sleep apnea and PAP therapy education were provided at length in the clinic today. Michael Babb verbalized understanding.  -Emphasized diet, exercise, and weight loss in the clinic, as were non-supine sleep, avoiding alcohol in the late evening, and driving or operating heavy machinery when sleepy.  Michael Campuzanooverbalizes understanding of the above instructions and risks.    # HYPERTENSION:  -Blood pressure was 149/75 today.  -Denies headache, palpitation, and syncope in the clinic.  -Follows with PCP/ Cardiology     # OBESITY :  -with a BMI of 32. Michael Babb most recent Bicarbonate was   Bicarbonate   Date Value Ref Range Status   10/04/2023 29 21 - 32 mmol/L Final   -Encourage to have regular exercise to manage weight well.    # XEROSTOMIA:  -Instruct Michael Duque purchase the Biotene gel to ease the dry mouth symptom,        RTC ASAP      All of patient's questions were answered. He verbalizes understanding and agreement with my assessment and plan.

## 2024-03-19 ENCOUNTER — OFFICE VISIT (OUTPATIENT)
Dept: SLEEP MEDICINE | Facility: CLINIC | Age: 69
End: 2024-03-19
Payer: MEDICARE

## 2024-03-19 VITALS
HEART RATE: 55 BPM | DIASTOLIC BLOOD PRESSURE: 75 MMHG | WEIGHT: 218 LBS | TEMPERATURE: 98.1 F | HEIGHT: 69 IN | OXYGEN SATURATION: 97 % | SYSTOLIC BLOOD PRESSURE: 149 MMHG | RESPIRATION RATE: 16 BRPM | BODY MASS INDEX: 32.29 KG/M2

## 2024-03-19 DIAGNOSIS — K11.7 XEROSTOMIA: ICD-10-CM

## 2024-03-19 DIAGNOSIS — E66.9 OBESITY (BMI 30-39.9): ICD-10-CM

## 2024-03-19 DIAGNOSIS — I10 HYPERTENSION, UNSPECIFIED TYPE: ICD-10-CM

## 2024-03-19 DIAGNOSIS — G47.33 OSA (OBSTRUCTIVE SLEEP APNEA): Primary | ICD-10-CM

## 2024-03-19 PROCEDURE — 99204 OFFICE O/P NEW MOD 45 MIN: CPT

## 2024-03-19 PROCEDURE — 1159F MED LIST DOCD IN RCRD: CPT

## 2024-03-19 PROCEDURE — 3078F DIAST BP <80 MM HG: CPT

## 2024-03-19 PROCEDURE — 99214 OFFICE O/P EST MOD 30 MIN: CPT

## 2024-03-19 PROCEDURE — 3077F SYST BP >= 140 MM HG: CPT

## 2024-03-19 PROCEDURE — 1036F TOBACCO NON-USER: CPT

## 2024-03-19 PROCEDURE — 1160F RVW MEDS BY RX/DR IN RCRD: CPT

## 2024-03-19 PROCEDURE — 3008F BODY MASS INDEX DOCD: CPT

## 2024-03-19 ASSESSMENT — SLEEP AND FATIGUE QUESTIONNAIRES
HOW LIKELY ARE YOU TO NOD OFF OR FALL ASLEEP WHILE SITTING AND READING: WOULD NEVER DOZE
WORRIED_DISTRESSED_DUE_TO_SLEEP: SOMEWHAT
SATISFACTION_WITH_CURRENT_SLEEP_PATTERN: SATISFIED
SLEEP_PROBLEM_NOTICEABLE_TO_OTHERS: NOT AT ALL NOTICEABLE
SITING INACTIVE IN A PUBLIC PLACE LIKE A CLASS ROOM OR A MOVIE THEATER: WOULD NEVER DOZE
HOW LIKELY ARE YOU TO NOD OFF OR FALL ASLEEP WHILE SITTING AND TALKING TO SOMEONE: WOULD NEVER DOZE
HOW LIKELY ARE YOU TO NOD OFF OR FALL ASLEEP WHEN YOU ARE A PASSENGER IN A CAR FOR AN HOUR WITHOUT A BREAK: WOULD NEVER DOZE
ESS-CHAD TOTAL SCORE: 0
HOW LIKELY ARE YOU TO NOD OFF OR FALL ASLEEP IN A CAR, WHILE STOPPED FOR A FEW MINUTES IN TRAFFIC: WOULD NEVER DOZE
SLEEP_PROBLEM_INTERFERES_DAILY_ACTIVITIES: A LITTLE
HOW LIKELY ARE YOU TO NOD OFF OR FALL ASLEEP WHILE LYING DOWN TO REST IN THE AFTERNOON WHEN CIRCUMSTANCES PERMIT: WOULD NEVER DOZE
WAKING_TOO_EARLY: SEVERE
HOW LIKELY ARE YOU TO NOD OFF OR FALL ASLEEP WHILE SITTING QUIETLY AFTER LUNCH WITHOUT ALCOHOL: WOULD NEVER DOZE
HOW LIKELY ARE YOU TO NOD OFF OR FALL ASLEEP WHILE WATCHING TV: WOULD NEVER DOZE
DIFFICULTY_STAYING_ASLEEP: MODERATE

## 2024-04-05 ENCOUNTER — OFFICE VISIT (OUTPATIENT)
Dept: DERMATOLOGY | Facility: CLINIC | Age: 69
End: 2024-04-05
Payer: MEDICARE

## 2024-04-05 DIAGNOSIS — L82.0 INFLAMED SEBORRHEIC KERATOSIS: ICD-10-CM

## 2024-04-05 DIAGNOSIS — L57.0 ACTINIC KERATOSIS: ICD-10-CM

## 2024-04-05 PROCEDURE — 88305 TISSUE EXAM BY PATHOLOGIST: CPT | Performed by: DERMATOLOGY

## 2024-04-05 PROCEDURE — 3008F BODY MASS INDEX DOCD: CPT | Performed by: SPECIALIST

## 2024-04-05 PROCEDURE — 1160F RVW MEDS BY RX/DR IN RCRD: CPT | Performed by: SPECIALIST

## 2024-04-05 PROCEDURE — 17110 DESTRUCTION B9 LES UP TO 14: CPT | Performed by: SPECIALIST

## 2024-04-05 PROCEDURE — 11300 SHAVE SKIN LESION 0.5 CM/<: CPT | Performed by: SPECIALIST

## 2024-04-05 PROCEDURE — 1159F MED LIST DOCD IN RCRD: CPT | Performed by: SPECIALIST

## 2024-04-05 NOTE — PROGRESS NOTES
Subjective   HPI: Michael Babb is a 68 y.o. male is here for evaluation and treatment of a spot on my left side and my left hand..     ROS: No other skin or systemic complaints other than what is documented elsewhere in the note.    ALLERGIES: Lisinopril    SOCIAL:  reports that he has never smoked. He has never used smokeless tobacco. He reports that he does not currently use alcohol. He reports that he does not currently use drugs.    Objective     Description: Patient has an irritated papule photodamaged area left side of thorax.  Patient has an irregularly pigmented lesion anterior aspect of left hand.    Assessment/Plan   1. Inflamed seborrheic keratosis  Left Flank    Skin biopsy - Left Flank    Specimen 1 - Dermatopathology- DERM LAB  Differential Diagnosis: ISK   Check Margins Yes/No?:    Comments:    Dermpath Lab: Routine Histopathology (formalin-fixed tissue)    2. Actinic keratosis  Head - Anterior (Face)    Cryotherapy, skin lesion - Head - Anterior (Face)       Plan: Shave excision of irritated seborrheic keratosis squamous cell carcinoma left flank.  Cryosurgery removal of atypical lentigo left dorsal aspect of hand.    FOLLOW UP: If any lesions change.    The patient was encouraged to contact me with any further questions or concerns.  Ian Price MD  4/5/2024

## 2024-04-06 ENCOUNTER — LAB (OUTPATIENT)
Dept: LAB | Facility: LAB | Age: 69
End: 2024-04-06
Payer: MEDICARE

## 2024-04-06 DIAGNOSIS — Z12.5 SCREENING FOR PROSTATE CANCER: ICD-10-CM

## 2024-04-06 DIAGNOSIS — E78.5 DYSLIPIDEMIA (HIGH LDL; LOW HDL): ICD-10-CM

## 2024-04-06 DIAGNOSIS — I10 BENIGN ESSENTIAL HYPERTENSION: ICD-10-CM

## 2024-04-06 PROCEDURE — 84075 ASSAY ALKALINE PHOSPHATASE: CPT | Performed by: NURSE PRACTITIONER

## 2024-04-06 PROCEDURE — 80061 LIPID PANEL: CPT | Performed by: NURSE PRACTITIONER

## 2024-04-06 PROCEDURE — 84153 ASSAY OF PSA TOTAL: CPT | Performed by: NURSE PRACTITIONER

## 2024-04-06 PROCEDURE — 85027 COMPLETE CBC AUTOMATED: CPT | Performed by: NURSE PRACTITIONER

## 2024-04-07 LAB
ALBUMIN SERPL BCP-MCNC: 4.6 G/DL (ref 3.4–5)
ALP SERPL-CCNC: 46 U/L (ref 33–136)
ALT SERPL W P-5'-P-CCNC: 23 U/L (ref 10–52)
ANION GAP SERPL CALC-SCNC: 16 MMOL/L (ref 10–20)
AST SERPL W P-5'-P-CCNC: 22 U/L (ref 9–39)
BILIRUB SERPL-MCNC: 0.7 MG/DL (ref 0–1.2)
BUN SERPL-MCNC: 19 MG/DL (ref 6–23)
CALCIUM SERPL-MCNC: 9.8 MG/DL (ref 8.6–10.6)
CHLORIDE SERPL-SCNC: 102 MMOL/L (ref 98–107)
CHOLEST SERPL-MCNC: 168 MG/DL (ref 0–199)
CHOLESTEROL/HDL RATIO: 3.9
CO2 SERPL-SCNC: 29 MMOL/L (ref 21–32)
CREAT SERPL-MCNC: 1.11 MG/DL (ref 0.5–1.3)
EGFRCR SERPLBLD CKD-EPI 2021: 73 ML/MIN/1.73M*2
ERYTHROCYTE [DISTWIDTH] IN BLOOD BY AUTOMATED COUNT: 13.6 % (ref 11.5–14.5)
GLUCOSE SERPL-MCNC: 99 MG/DL (ref 74–99)
HCT VFR BLD AUTO: 43.1 % (ref 41–52)
HDLC SERPL-MCNC: 43.4 MG/DL
HGB BLD-MCNC: 14.2 G/DL (ref 13.5–17.5)
LDLC SERPL CALC-MCNC: 72 MG/DL
MCH RBC QN AUTO: 30.7 PG (ref 26–34)
MCHC RBC AUTO-ENTMCNC: 32.9 G/DL (ref 32–36)
MCV RBC AUTO: 93 FL (ref 80–100)
NON HDL CHOLESTEROL: 125 MG/DL (ref 0–149)
NRBC BLD-RTO: 0 /100 WBCS (ref 0–0)
PLATELET # BLD AUTO: 198 X10*3/UL (ref 150–450)
POTASSIUM SERPL-SCNC: 4.4 MMOL/L (ref 3.5–5.3)
PROT SERPL-MCNC: 7.3 G/DL (ref 6.4–8.2)
PSA SERPL-MCNC: 0.26 NG/ML
RBC # BLD AUTO: 4.63 X10*6/UL (ref 4.5–5.9)
SODIUM SERPL-SCNC: 143 MMOL/L (ref 136–145)
TRIGL SERPL-MCNC: 265 MG/DL (ref 0–149)
VLDL: 53 MG/DL (ref 0–40)
WBC # BLD AUTO: 6.8 X10*3/UL (ref 4.4–11.3)

## 2024-04-11 ENCOUNTER — OFFICE VISIT (OUTPATIENT)
Dept: PRIMARY CARE | Facility: CLINIC | Age: 69
End: 2024-04-11
Payer: MEDICARE

## 2024-04-11 VITALS
BODY MASS INDEX: 32.73 KG/M2 | DIASTOLIC BLOOD PRESSURE: 82 MMHG | SYSTOLIC BLOOD PRESSURE: 130 MMHG | HEIGHT: 69 IN | WEIGHT: 221 LBS | HEART RATE: 60 BPM | OXYGEN SATURATION: 93 %

## 2024-04-11 DIAGNOSIS — E78.5 DYSLIPIDEMIA (HIGH LDL; LOW HDL): ICD-10-CM

## 2024-04-11 DIAGNOSIS — E55.9 VITAMIN D DEFICIENCY: ICD-10-CM

## 2024-04-11 DIAGNOSIS — N40.0 BENIGN PROSTATIC HYPERPLASIA, UNSPECIFIED WHETHER LOWER URINARY TRACT SYMPTOMS PRESENT: ICD-10-CM

## 2024-04-11 DIAGNOSIS — I10 BENIGN ESSENTIAL HYPERTENSION: ICD-10-CM

## 2024-04-11 DIAGNOSIS — E66.09 CLASS 1 OBESITY DUE TO EXCESS CALORIES WITH SERIOUS COMORBIDITY AND BODY MASS INDEX (BMI) OF 32.0 TO 32.9 IN ADULT: ICD-10-CM

## 2024-04-11 DIAGNOSIS — G82.20 PARAPARESIS (MULTI): ICD-10-CM

## 2024-04-11 DIAGNOSIS — B18.2 CHRONIC HEPATITIS C WITHOUT HEPATIC COMA (MULTI): ICD-10-CM

## 2024-04-11 DIAGNOSIS — Z00.00 MEDICARE ANNUAL WELLNESS VISIT, SUBSEQUENT: Primary | ICD-10-CM

## 2024-04-11 DIAGNOSIS — Z71.89 ADVANCE DIRECTIVE DISCUSSED WITH PATIENT: ICD-10-CM

## 2024-04-11 DIAGNOSIS — N52.9 ERECTILE DYSFUNCTION, UNSPECIFIED ERECTILE DYSFUNCTION TYPE: ICD-10-CM

## 2024-04-11 DIAGNOSIS — E29.1 TESTICULAR HYPERGONADOTROPIC HYPOGONADISM: ICD-10-CM

## 2024-04-11 PROCEDURE — 99214 OFFICE O/P EST MOD 30 MIN: CPT | Performed by: NURSE PRACTITIONER

## 2024-04-11 PROCEDURE — 1159F MED LIST DOCD IN RCRD: CPT | Performed by: NURSE PRACTITIONER

## 2024-04-11 PROCEDURE — G0439 PPPS, SUBSEQ VISIT: HCPCS | Performed by: NURSE PRACTITIONER

## 2024-04-11 PROCEDURE — 3008F BODY MASS INDEX DOCD: CPT | Performed by: NURSE PRACTITIONER

## 2024-04-11 PROCEDURE — 3079F DIAST BP 80-89 MM HG: CPT | Performed by: NURSE PRACTITIONER

## 2024-04-11 PROCEDURE — 1160F RVW MEDS BY RX/DR IN RCRD: CPT | Performed by: NURSE PRACTITIONER

## 2024-04-11 PROCEDURE — 1170F FXNL STATUS ASSESSED: CPT | Performed by: NURSE PRACTITIONER

## 2024-04-11 PROCEDURE — G0444 DEPRESSION SCREEN ANNUAL: HCPCS | Performed by: NURSE PRACTITIONER

## 2024-04-11 PROCEDURE — 1123F ACP DISCUSS/DSCN MKR DOCD: CPT | Performed by: NURSE PRACTITIONER

## 2024-04-11 PROCEDURE — 3075F SYST BP GE 130 - 139MM HG: CPT | Performed by: NURSE PRACTITIONER

## 2024-04-11 RX ORDER — METOPROLOL SUCCINATE 100 MG/1
100 TABLET, EXTENDED RELEASE ORAL 2 TIMES DAILY
Qty: 90 TABLET | Refills: 3 | Status: SHIPPED | OUTPATIENT
Start: 2024-04-11

## 2024-04-11 RX ORDER — ROSUVASTATIN CALCIUM 5 MG/1
5 TABLET, COATED ORAL DAILY
Qty: 90 TABLET | Refills: 3 | Status: SHIPPED | OUTPATIENT
Start: 2024-04-11 | End: 2025-04-06

## 2024-04-11 RX ORDER — TADALAFIL 20 MG/1
20 TABLET ORAL AS NEEDED
Qty: 10 TABLET | Refills: 1 | Status: SHIPPED | OUTPATIENT
Start: 2024-04-11

## 2024-04-11 RX ORDER — LOSARTAN POTASSIUM 100 MG/1
100 TABLET ORAL DAILY
Qty: 90 TABLET | Refills: 3 | Status: SHIPPED | OUTPATIENT
Start: 2024-04-11

## 2024-04-11 RX ORDER — TRIAMTERENE/HYDROCHLOROTHIAZID 37.5-25 MG
1 TABLET ORAL 2 TIMES DAILY
Qty: 90 TABLET | Refills: 3 | Status: SHIPPED | OUTPATIENT
Start: 2024-04-11

## 2024-04-11 RX ORDER — TESTOSTERONE CYPIONATE 1000 MG/10ML
100 INJECTION, SOLUTION INTRAMUSCULAR
Qty: 2 ML | Refills: 2 | Status: SHIPPED | OUTPATIENT
Start: 2024-04-11 | End: 2024-04-16

## 2024-04-11 ASSESSMENT — ACTIVITIES OF DAILY LIVING (ADL)
BATHING: INDEPENDENT
MANAGING_FINANCES: INDEPENDENT
TAKING_MEDICATION: INDEPENDENT
DOING_HOUSEWORK: INDEPENDENT
GROCERY_SHOPPING: INDEPENDENT
DRESSING: INDEPENDENT

## 2024-04-11 ASSESSMENT — PATIENT HEALTH QUESTIONNAIRE - PHQ9
1. LITTLE INTEREST OR PLEASURE IN DOING THINGS: NOT AT ALL
2. FEELING DOWN, DEPRESSED OR HOPELESS: NOT AT ALL
SUM OF ALL RESPONSES TO PHQ9 QUESTIONS 1 AND 2: 0

## 2024-04-11 ASSESSMENT — ENCOUNTER SYMPTOMS
LOSS OF SENSATION IN FEET: 0
OCCASIONAL FEELINGS OF UNSTEADINESS: 0
DEPRESSION: 0

## 2024-04-11 NOTE — PROGRESS NOTES
"Subjective   Reason for Visit: Michael Babb is an 68 y.o. male here for a Medicare Wellness visit.     Past Medical, Surgical, and Family History reviewed and updated in chart.    Reviewed all medications by prescribing practitioner or clinical pharmacist (such as prescriptions, OTCs, herbal therapies and supplements) and documented in the medical record.    Patient is also following up for lab results review and management of multiple chronic diseases.  His lab results are unremarkable besides elevated triglyceride 265.  He completed a screening colonoscopy on 1/15/2024 and repeat for surveillance in 5 years.  He is currently on Cialis for erectile dysfunction with minimal response.  Reports that he had better response when he was both Cialis and testosterone.  However, he is no longer seeing urology and has been out of testosterone.  Otherwise, denies acute medical complaint.        Patient Care Team:  JOSS Nava as PCP - General (Family Medicine)  JOSS Nava as PCP - Jair O PCP     Review of Systems   All other systems reviewed and are negative.      Objective   Vitals:  /82 (BP Location: Left arm, Patient Position: Sitting)   Pulse 60   Ht 1.753 m (5' 9\")   Wt 100 kg (221 lb)   SpO2 93%   BMI 32.64 kg/m²       Physical Exam  Vitals reviewed.   Constitutional:       Appearance: Normal appearance. He is obese.   HENT:      Head: Normocephalic and atraumatic.      Right Ear: External ear normal.      Left Ear: External ear normal.      Nose: Nose normal.      Mouth/Throat:      Mouth: Mucous membranes are moist.   Eyes:      Extraocular Movements: Extraocular movements intact.      Conjunctiva/sclera: Conjunctivae normal.      Pupils: Pupils are equal, round, and reactive to light.   Cardiovascular:      Rate and Rhythm: Normal rate and regular rhythm.      Pulses: Normal pulses.      Heart sounds: Normal heart sounds.   Pulmonary:      Effort: Pulmonary effort is " normal.      Breath sounds: Normal breath sounds.   Abdominal:      General: Bowel sounds are normal.      Palpations: Abdomen is soft.   Musculoskeletal:      Cervical back: Neck supple.   Skin:     General: Skin is warm and dry.   Neurological:      General: No focal deficit present.      Mental Status: He is alert and oriented to person, place, and time.   Psychiatric:         Mood and Affect: Mood normal.         Behavior: Behavior normal.         Thought Content: Thought content normal.         Judgment: Judgment normal.         Assessment/Plan   Problem List Items Addressed This Visit       Dyslipidemia (high LDL; low HDL)    Erectile dysfunction    Medicare annual wellness visit, subsequent     Other Visit Diagnoses       Routine general medical examination at health care facility    -  Primary    Benign essential hypertension        Blood pressure is at goal, continue current medications.

## 2024-04-11 NOTE — PATIENT INSTRUCTIONS
I have started you on testosterone injection 100mg every two weeks. Complete labs a few days prior to 3 months follow up.

## 2024-04-15 ENCOUNTER — TELEPHONE (OUTPATIENT)
Dept: PRIMARY CARE | Facility: CLINIC | Age: 69
End: 2024-04-15
Payer: MEDICARE

## 2024-04-15 PROBLEM — E55.9 HYPOVITAMINOSIS D: Status: RESOLVED | Noted: 2023-04-07 | Resolved: 2024-04-15

## 2024-04-15 PROBLEM — E66.09 CLASS 1 OBESITY DUE TO EXCESS CALORIES WITH SERIOUS COMORBIDITY AND BODY MASS INDEX (BMI) OF 32.0 TO 32.9 IN ADULT: Status: ACTIVE | Noted: 2024-04-15

## 2024-04-15 PROBLEM — G82.20 PARAPARESIS (MULTI): Status: ACTIVE | Noted: 2023-04-07

## 2024-04-15 PROBLEM — E66.9 OBESITY (BMI 30-39.9): Status: RESOLVED | Noted: 2023-04-07 | Resolved: 2024-04-15

## 2024-04-15 PROBLEM — E66.811 CLASS 1 OBESITY DUE TO EXCESS CALORIES WITH SERIOUS COMORBIDITY AND BODY MASS INDEX (BMI) OF 32.0 TO 32.9 IN ADULT: Status: ACTIVE | Noted: 2024-04-15

## 2024-04-15 NOTE — TELEPHONE ENCOUNTER
He could not fill prescription for Testosterone / issue with the strength ?  [facility] said they were going to call office     I don't see any messages from pharmacy.  Michael asked if office could find out what the issue is.

## 2024-04-16 ENCOUNTER — TELEPHONE (OUTPATIENT)
Dept: PRIMARY CARE | Facility: CLINIC | Age: 69
End: 2024-04-16
Payer: MEDICARE

## 2024-04-16 DIAGNOSIS — E29.1 TESTICULAR HYPERGONADOTROPIC HYPOGONADISM: Primary | ICD-10-CM

## 2024-04-16 RX ORDER — TESTOSTERONE CYPIONATE 200 MG/ML
200 INJECTION, SOLUTION INTRAMUSCULAR
Qty: 2 ML | Refills: 2 | Status: SHIPPED | OUTPATIENT
Start: 2024-04-16 | End: 2024-07-15

## 2024-04-16 NOTE — TELEPHONE ENCOUNTER
Testosterone 100 mg is not available (was not an insurance issue)    200 mg is available & if okay send Rx for Testosterone 200 mg with adjustment on dosage    [facility] [employer]

## 2024-04-29 ENCOUNTER — OFFICE VISIT (OUTPATIENT)
Dept: DERMATOLOGY | Facility: CLINIC | Age: 69
End: 2024-04-29
Payer: MEDICARE

## 2024-04-29 DIAGNOSIS — L82.0 SEBORRHEIC KERATOSIS, INFLAMED: ICD-10-CM

## 2024-04-29 PROCEDURE — 11302 SHAVE SKIN LESION 1.1-2.0 CM: CPT | Performed by: SPECIALIST

## 2024-04-29 PROCEDURE — 88305 TISSUE EXAM BY PATHOLOGIST: CPT | Performed by: DERMATOLOGY

## 2024-04-29 PROCEDURE — 88342 IMHCHEM/IMCYTCHM 1ST ANTB: CPT | Performed by: DERMATOLOGY

## 2024-04-29 PROCEDURE — 17110 DESTRUCTION B9 LES UP TO 14: CPT | Performed by: SPECIALIST

## 2024-04-29 PROCEDURE — 1160F RVW MEDS BY RX/DR IN RCRD: CPT | Performed by: SPECIALIST

## 2024-04-29 PROCEDURE — 1123F ACP DISCUSS/DSCN MKR DOCD: CPT | Performed by: SPECIALIST

## 2024-04-29 PROCEDURE — 3008F BODY MASS INDEX DOCD: CPT | Performed by: SPECIALIST

## 2024-04-29 PROCEDURE — 1159F MED LIST DOCD IN RCRD: CPT | Performed by: SPECIALIST

## 2024-04-29 NOTE — PROGRESS NOTES
Subjective   HPI: Michael Babb is a 68 y.o. male is here for evaluation and treatment of a spot on my right arm and spots on my upper arms.    ROS: No other skin or systemic complaints other than what is documented elsewhere in the note.    ALLERGIES: Lisinopril    SOCIAL:  reports that he has never smoked. He has never used smokeless tobacco. He reports that he does not currently use alcohol. He reports that he does not currently use drugs.    Objective     Description: He has an inflamed erythematous scaly plaque involving a photodamaged area of his right arm.  There are 3 small inflamed waxy brown papules involving his upper arms.    Assessment/Plan   1. Seborrheic keratosis, inflamed  Left Forearm - Anterior    Shave removal - Left Forearm - Anterior    Specimen 1 - Dermatopathology- DERM LAB  Differential Diagnosis: ISK   Check Margins Yes/No?:    Comments:    Dermpath Lab: Routine Histopathology (formalin-fixed tissue)       Plan: Shave excision of irritated seborrheic keratosis versus squamous cell carcinoma left forearm.  Surgical destruction by hyfrecation of 3 irritated small seborrheic keratoses involving his arms.    FOLLOW UP: As needed    The patient was encouraged to contact me with any further questions or concerns.  Ian Price MD  4/29/2024

## 2024-05-01 NOTE — PROGRESS NOTES
[facility] Sleep [facility]  [address]  [facility]  [address]  [facility] [address]     [facility] Sleep [facility] Clinic  Follow UpVisit Note           Subjective   Patient ID: Michael Babb is a 68 y.o. male with past medical history significant for Obesity, Hypertension, Anxiety, and Obstructive sleep apnea.    5/9/24: UPDATED: He brought his Yoshi pap to the clinic. The previous setting was 15 CWP; I adjusted it to 18 CWP in the clinic because it needed more pressure and woke him up at night. He felt comfortable. I also provided a small sample mask of Yoshi's dreamwear under the nose small size in the clinic. His Ess is one today.       3/19/2024: The Patient is alone today for a comprehensive sleep medicine evaluation due to sleep apnea on pap. Today, he came to the office to ask for another pap because he has faithfully used pap and needs a new machine. He has a provider at [facility]. However, the provider moves out and needs to find another provider to establish care. He recently felt an extremely dry mouth when he woke up and required more pressure to wake him up at night. ESS: 0 JAVIER: 10, and neck circumference is 16 inches today.     HPI  The Patient had been having these symptoms for the past 25 years. The Patient was diagnosed with SHERMAN in 2019 by [facility] and has started on CPAP since then. Currently on auto-CPAP 18 cm H2O with EPR/Flex 1 and nasal pillow mask through [facility]. The Patient has been using the machine every night.      SLEEP STUDY HISTORY: (personally reviewed raw data such as interpretation report, data sheet, hypnogram, and titration table if available and applicable)  4/16/2019: pap titration: AHI : 82.7, Guanako: 77%,     SLEEP-WAKE SCHEDULE  Bedtime: 10 PM on weekdays, 11 PM on weekends  Subjective sleep latency: 10 minutes  Problems falling asleep: no  Number of awakenings: 2-3 times per night spontaneously for  urination  Falls back asleep in 10 minutes  Problems staying asleep: no  Final wake time: 5:30 AM on weekdays, 7 AM on weekends  Out of bed time: 5:40 AM on weekdays, 7 AM on weekends  Shift work: no  Naps: no  Average sleep duration (excluding naps): 7 hours     SLEEP ENVIRONMENT  Sleep location: bed  Sleep status: sleeps with girl friend  Room is dark:  Yes  Room is quiet: Yes  Room is cool: Yes  Bed comfort: good     SLEEP HABITS:   Activities before bedtime: watch TV  Activities in bed: no   Preferred sleep position: back     SLEEP ROS:  Night symptoms: POSITIVE for snoring before PAP but not now and mouth breathing  Morning symptoms: POSITIVE for unrefreshing sleep and morning dry mouth  Daytime symptoms POSITIVE for excessive daytime sleepiness before PAP but not now  Hypersomnia / narcolepsy symptoms: Patient denies symptoms of a hypersomnolence disorder such as sleep paralysis, sleep-related hallucinations, recurrent sleep attacks, automatic behaviors, and cataplexy.   RLS symptoms: Patient denies RLS symptoms.  Movements in sleep: Patient denies problematic movements in sleep.  Parasomnia symptoms: Patient denies symptoms of parasomnia.     WEIGHT: stable     REVIEW OF SYSTEMS: All other systems have been reviewed and are negative.     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 No significant changes unless documented in the pertinent chart section. Updates made when necessary.          Objective   Vitals:    05/09/24 0908   BP: 125/77   Pulse: 63   Resp: 16   Temp: 36.6 °C (97.8 °F)   SpO2: 97%      Physical Exam  Constitutional:alert and oriented to  time, place, and person  Lungs: Clear to auscultation bilateral, no rales  Heart: Regular rate and rhythm, no murmurs         PAP Adherence:  DURABLE MEDICAL EQUIPMENT COMPANY: [employer]  Machine: THERAPY: [employer] PRESSURE SETTINGS: 15 cm H2O  Mask: nasal pillow mask  Issues with therapy: ISSUES WITH THERAPY:needs more pressure  Benefits with PAP: PERCEIVED BENEFITS OF PAP: refreshing sleep decreased episodes of nocturia better sleep quality    A PAP adherence download was obtained and data was reviewed personally today in clinic. (see scanned document in EPIC)           Assessment/Plan   Michael Babb is a 68 y.o. male who is seen to evaluate for severe obstructive sleep apnea. Risk factors of sleep apnea as well as cardiometabolic and neurocognitive sequelae associated with untreated sleep apnea were also discussed. Lastly, patient was advised to avoid driving vehicle or operating heavy machinery when sleepy.      Michael Campuzanoo with the following problems:     # OBSTRUCTIVE SLEEP APNEA:  -Adjust to 18 cmH20 in the clinic, and send a adjsuted pressure to [facility]. I also order annual supplies via [facility].  -Sleep apnea and PAP therapy education were provided at length in the clinic today. Michael Babb verbalized understanding.  -Emphasized diet, exercise, and weight loss in the clinic, as were non-supine sleep, avoiding alcohol in the late evening, and driving or operating heavy machinery when sleepy.  Michael Campuzanooverbalizes understanding of the above instructions and risks.     # HYPERTENSION:  -Blood pressure was 125/77 today.  -Denies headache, palpitation, and syncope in the clinic.  -Follows with PCP/ Cardiology     # OBESITY :  -with a BMI of 32.75. Michael Babb most recent Bicarbonate was 29        Bicarbonate   Date Value Ref Range Status   10/04/2023 29 21 - 32 mmol/L Final   -Encourage to have regular exercise to manage weight well.     # XEROSTOMIA:  -Instruct Michael Duque purchase  the Biotene gel to ease the dry mouth symptom,        RTC 1 month after adjusting pressure        All of patient's questions were answered. He verbalizes understanding and agreement with my assessment and plan.

## 2024-05-03 LAB
LAB AP ASR DISCLAIMER: NORMAL
LABORATORY COMMENT REPORT: NORMAL
PATH REPORT.FINAL DX SPEC: NORMAL
PATH REPORT.GROSS SPEC: NORMAL
PATH REPORT.RELEVANT HX SPEC: NORMAL
PATH REPORT.TOTAL CANCER: NORMAL

## 2024-05-09 ENCOUNTER — OFFICE VISIT (OUTPATIENT)
Dept: SLEEP MEDICINE | Facility: CLINIC | Age: 69
End: 2024-05-09
Payer: MEDICARE

## 2024-05-09 VITALS
HEIGHT: 69 IN | WEIGHT: 221.8 LBS | DIASTOLIC BLOOD PRESSURE: 77 MMHG | OXYGEN SATURATION: 97 % | SYSTOLIC BLOOD PRESSURE: 125 MMHG | HEART RATE: 63 BPM | RESPIRATION RATE: 16 BRPM | BODY MASS INDEX: 32.85 KG/M2 | TEMPERATURE: 97.8 F

## 2024-05-09 DIAGNOSIS — I10 HYPERTENSION, UNSPECIFIED TYPE: ICD-10-CM

## 2024-05-09 DIAGNOSIS — G47.33 OSA (OBSTRUCTIVE SLEEP APNEA): Primary | ICD-10-CM

## 2024-05-09 DIAGNOSIS — G47.33 OBSTRUCTIVE SLEEP APNEA (ADULT) (PEDIATRIC): ICD-10-CM

## 2024-05-09 DIAGNOSIS — E66.9 OBESITY (BMI 30-39.9): ICD-10-CM

## 2024-05-09 PROCEDURE — 1159F MED LIST DOCD IN RCRD: CPT

## 2024-05-09 PROCEDURE — 1123F ACP DISCUSS/DSCN MKR DOCD: CPT

## 2024-05-09 PROCEDURE — 99214 OFFICE O/P EST MOD 30 MIN: CPT

## 2024-05-09 PROCEDURE — 1036F TOBACCO NON-USER: CPT

## 2024-05-09 PROCEDURE — 3008F BODY MASS INDEX DOCD: CPT

## 2024-05-09 PROCEDURE — 3074F SYST BP LT 130 MM HG: CPT

## 2024-05-09 PROCEDURE — 1160F RVW MEDS BY RX/DR IN RCRD: CPT

## 2024-05-09 PROCEDURE — 3078F DIAST BP <80 MM HG: CPT

## 2024-05-09 ASSESSMENT — SLEEP AND FATIGUE QUESTIONNAIRES
HOW LIKELY ARE YOU TO NOD OFF OR FALL ASLEEP WHILE SITTING QUIETLY AFTER LUNCH WITHOUT ALCOHOL: WOULD NEVER DOZE
SITING INACTIVE IN A PUBLIC PLACE LIKE A CLASS ROOM OR A MOVIE THEATER: WOULD NEVER DOZE
HOW LIKELY ARE YOU TO NOD OFF OR FALL ASLEEP WHEN YOU ARE A PASSENGER IN A CAR FOR AN HOUR WITHOUT A BREAK: WOULD NEVER DOZE
HOW LIKELY ARE YOU TO NOD OFF OR FALL ASLEEP WHILE SITTING AND READING: WOULD NEVER DOZE
HOW LIKELY ARE YOU TO NOD OFF OR FALL ASLEEP WHILE WATCHING TV: WOULD NEVER DOZE
HOW LIKELY ARE YOU TO NOD OFF OR FALL ASLEEP WHILE LYING DOWN TO REST IN THE AFTERNOON WHEN CIRCUMSTANCES PERMIT: SLIGHT CHANCE OF DOZING
ESS-CHAD TOTAL SCORE: 1
HOW LIKELY ARE YOU TO NOD OFF OR FALL ASLEEP IN A CAR, WHILE STOPPED FOR A FEW MINUTES IN TRAFFIC: WOULD NEVER DOZE
HOW LIKELY ARE YOU TO NOD OFF OR FALL ASLEEP WHILE SITTING AND TALKING TO SOMEONE: WOULD NEVER DOZE

## 2024-05-09 NOTE — PATIENT INSTRUCTIONS
[facility]  POR [address]  [facility]  [address]  [facility] [address]       Thank you for coming to the Sleep Medicine Clinic today! Your sleep medicine provider today was: JOSS Bustamante Below is a summary of your treatment plan, patient education, other important information, and our contact numbers.    Dear Michael Babb,    Thank you for visiting  Sleep Medicine Clinic !       1. According to your symptom and sleep study report. I adjust your pressure and order  the new PAP supplies for you to control your sleep apnea, feel free to contact your DME. If [employer] is your DME, you can reach them at [phone].     2. Please do not drive when you are sleepy and  start practicing the sleep hygiene  as discussed in clinic today.     3. Please continue practicing the appropriate nasal spray at night to ease your nasal congestion as discussed in clinic today, and using Biotene to ease your dry mouth if needed.    4. FOR QUESTIONS AND CONCERNS:   a) : In case of problems with machine or mask interface, please contact your DME company first. DME is the company who provides you the machine and/or PAP supplies / accessories. If [employer] is your DME, you can reach them at [phone].   b): Please call my office with issues or questions: [phone] ([facility]); [phone] ([facility]); [phone] ([facility])    If you have a CPAP or BiPAP machine at home, please bring the unit and all accessories including the power cord to your appointments unless I tell you otherwise. Please have knowledge of the DME company you worked with to receive your PAP device. If you have copies of any previous sleep testing completed outside of , please bring with you to clinic as well. This information will make our visits more productive.     If you are new to CPAP or BiPAP, please note the minimum usage insurance  requires to continuing coverage for the equipment as noted by your DME company. Please discuss equipment issues (PAP unit, mask fit, humidification, etc.) with your DME company first.       In the event that you are running more than 10 minutes late to your appointment, I will kindly ask you to reschedule. Thanks.      TREATMENT PLAN     Follow-up Appointment:   Follow-up in 1 month after pressure change    PATIENT EDUCATION     OBSTRUCTIVE SLEEP APNEA (SHERMAN) is a sleep disorder where your upper airway muscles relax during sleep and the airway intermittently and repetitively narrows and collapses leading to partially blocked airway (hypopnea) or completely blocked airway (apnea) which, in turn, can disrupt breathing in sleep, lower oxygen levels while you sleep and cause night time wakings. Because both apnea and hypopnea may cause higher carbon dioxide or low oxygen levels, untreated SHERMAN can lead to heart arrhythmia, elevation of blood pressure, and make it harder for the body to consolidate memory and facilitate metabolism (leading to higher blood sugars at night). Frequent partial arousals occur during sleep resulting in sleep deprivation and daytime sleepiness. SHERMAN is associated with an increased risk of cardiovascular disease, stroke, hypertension, and insulin resistance. Moreover, untreated SHERMAN with excessive daytime sleepiness can increase the risk of motor vehicular accidents.    Below are conservative strategies for SHERMAN regardless of SHERMAN severity are:   Positional therapy - Avoid sleeping on your back.   Healthy diet and regular exercise to optimize weight is highly encouraged.   Avoid alcohol late in the evening and sedative-hypnotics as these substances can make sleep apnea worse.   Improve breathing through the nose with intranasal steroid spray, saline rinse, or antihistamines    Safety: Avoid driving vehicle and operating heavy equipment while sleepy. Drowsy driving may lead to life-threatening motor  vehicle accidents. A person driving while sleepy is 5 times more likely to have an accident. If you feel sleepy, pull over and take a short power nap (sleep for less than 30 minutes). Otherwise, ask somebody to drive you.    Treatment options for sleep apnea include weight management, positional therapy, Positive Airway Therapy (PAP) therapy, oral appliance therapy, hypoglossal nerve stimulator (Inspire) and select airway surgeries.    Starting Positive Airway Pressure (PAP): You were ordered a device to wear when you sleep called PAP (Positive Airway Pressure) to treat your sleep apnea. The order will be submitted to a durable medical equipment (DME) company who will arrange setting you up with the device. They will provide all the necessary equipment and discuss use and maintenance of the device with you as well as mask fitting and process of replacing / renewing PAP supplies or accessories. Once you get the machine, please start using it immediately. You may not be successful right away and that is okay. [place] be certain that you keep trying nightly and reach out to DME if you are struggling or having issues with machine usage.     *Please follow-up with me in 1-2 months of starting CPAP to see how well it is working for you and to do some troubleshooting if needed. Also, please bring all PAP equipment with you to follow up appointments unless told otherwise.     Important things to keep in mind as you start PAP:  Insurance will monitor your usage during the first 90 days. You should use your PAP - all night, every night, and including all naps (especially if naps are more than 30 minutes) for your health. The bare minimum is to use your PAP device while sleeping for at least 4 hours per day at least 5-6 days per week.. Otherwise, your PAP device will be reclaimed by your DME company at 90 days.  There are many masks to choose from to wear with your PAP machine. If you are not comfortable with the first mask  issued to you, call your DME company and ask for another option to try. You typically have a 30-day mask guarantee from the day you received your machine.   Discuss with your provider if you are having issues breathing with the machine or if the temperature or humidity feel uncomfortable.  Expect to have an adjustment period when you start your device. It helps to continuing wearing the machine every day for a period of time until you get more used to it. You can practice with wearing the mask alone if you need, then add in the PAP air pressure a few days later.   Reach out for help if you are struggling! The sleep medicine department can be reached at [phone]  We encourage you to download data monitoring apps to your phone. For Rent Jungle 10/11 - [employer] erika. For Apropose - DreamMapper. Both apps are available in the Erika store for free and are a great tool to monitor your progress with your PAP device night to night.    Tips for success with PAP machine usage:  Comfortable and well-fitting mask  Appropriate pressure on the machine  Using humidification  Support from bed partner and clinical team      Maintaining your CPAP/BPAP device:    The humidification chamber (aka water tank or water chamber) needs to be filled with distilled water to prevent buildup of white deposits in the future. If you cannot find distilled water, you can use tap water but expect to have white deposits buildup seen after prolonged use with tap water. If you start seeing white deposits on the water chamber, you can clean it by filling it with equal parts of distilled white vinegar and water. Let the vinegar-water mixture sit for 2 hours, and then rinse it with running tap water. Clean with soap and water then let it dry.     You should try to keep your machine clean in order to work well. Here are some tips to clean PAP supplies / accessories:    Clean the humidification chamber (aka water tank) as well as your  mask and tubing at least once a week with soap and water.   Alternatively, you can fill a sink or basin with warm water and add a little mild detergent, like Ivory dish soap. Gently wipe your supplies with the soapy water to free all the oils and dirt that may have collected. Once that's done, rinse these items with clean water until the soap is gone and let them air dry. You can hang your tubing over the curtain tim in your bathroom so that it dries.  The mask insert (part of the mask that has contact with your skin) needs to be cleaned with soap and water daily. Another option is to wipe them down with CPAP wipes or baby wipes.    You should replace your mask and tubing frequently in order to prevent bacteria buildup, machine damage, and mask seal issues. The older the mask and hose, the high likelihood that there is bacteria buildup in it especially if they are not cleaned regularly. Dirty filters damage machines because build-up of dust and contaminants can cause machine to over-heat, and in time, damage the motor of machine. Cushions lose their seal over time as most masks are made of plastic and silicone while headgear is made of neoprene. These materials will break down with age and frequent use. Here is the recommended replacement schedule for PAP supplies / accessories:    Twice a month- disposable filters and cushions for nasal mask or nasal pillows.  Once a month- cushion for full face mask  Every 3 months- mask with headgear and PAP tubing (standard or heated hose)  Every 6 months- reusable filter, water chamber, and chin strap     Other useful information:    Some people do not put water in the tank while other people prefers to put water in the tank to prevent mouth dryness. Try to experiment to determine which is more comfortable for you.   In general, new machines have 2 years warranty on parts while health insurance allows you to have a new machine once every 5 years.     Common issues with PAP  machine:    Mask gets dislodged when turning to the side: Consider getting a CPAP pillow or switching to a mask with hose on top.     Dry mouth:  Your machine has built-in humidifier that heats up the air to prevent dry mouth. It can be adjusted to your comfort. You can try that first and increase setting one level one night at a time to check which setting is comfortable and effective in lessening dry mouth. In some patients with heated hose, adjusting tube temperature to make air warmer can improve dry mouth. If dry mouth persists despite adjusting humidity or tube temperature setting, may apply OTC Biotene gel over the gums at bedtime.  If Biotene gel is not effective, consider trying XEROSTOM gel from [employer].  Also, eliminate or reduce dose of medications that can cause dry mouth if possible. Lastly, may try getting a separate room humidifier machine.    Airleaks: Please call DME as they may need to adjust your mask or refit you with a different kind or different size of mask. In addition, you can ask DME for tips on getting a good mask seal and mask fit.     Difficulty tolerating the mask: Contact your DME to try a different kind of mask and/or call office to get a referral to Sleep Psychologist for CPAP desensitization. CPAP desensitization technique is a set of strategies that helps patient cope with claustrophobia and anxiety related to wearing mask. Alternatively, we can do a daytime mini-sleep study called PAP-nap trial wherein you will try on different kinds of mask and the sleep technician will try different pressure settings on CPAP and BPAP machines to see which specific pressure is tolerable and comfortable for you.     Water droplets or moisture within the hose and/or mask: This is called rain-out and it is caused by condensation of too much heated humidity on the cooler walls of the hose. If you have rain-out, turn down humidity settings or get a heated hose. If you already have a heated hose,  "turn up the \"tube temperature\" of the heated hose. Alternatively, if you don't want to get a heated hose or warmer air, may wrap the CPAP hose with stockings to keep it somewhat warm. Also, you need to place the machine on the floor and lower the hose so that water won't travel upward towards your mask.     PAP desensitization techniques: If you have concerns about something being on your face at night, you can start by getting used to it before trying to sleep with it as follows:      Sit in a comfortable chair or bed. Connect the mask and hose to the CPAP/BPAP machine. Hold the mask on your face (without straps on) and turn on the machine. Practice breathing with the mask on while awake sitting and watching television, reading, or performing a sedentary activity during the day for 5-10 minutes and then take it off.  If tolerated, try again and gradually build up to longer periods of time. If not tolerated, try and try again until it is more comfortable as you become more desensitized. If you are able to use it for at least 20-30 minutes, move unto the next step.     Sit in a comfortable chair or bed. Connect the mask and hose to the CPAP/BPAP machine. Strap the mask on your head and turn on the machine. Practice breathing with the mask and headgear on while awake sitting and watching television, reading, or performing a sedentary activity. Start with 5-10 minutes and gradually increasing time until you can wear it comfortably for at least 20-30 minutes, then move to the next step.    Take a shorter daytime nap with machine turned on while you are in a reclined position in bed, sofa, or recliner. Start with 5-10 minute nap and gradually increase up to 30 minutes. It is not important whether you fall asleep or not. The goal is to rest comfortably with PAP machine on.     Reintroduce PAP machine into nighttime sleep. You can begin using it a portion of the night and gradually increase up to entire night.     Proceed " from one step to the next only when you are completely comfortable. If you feel any anxiety or discomfort, return to the previous step, then proceed again when comfortable.    Expect to “work” with your CPAP/BIPAP unit. It is important to try to relax when beginning CPAP/BIPAP therapy. Inhalation and exhalation should occur through the nose only. If you are unable to consistently breathe this way, do not panic or lose hope. There are other types of masks which allow you to breathe through your nose and/or your mouth. Also, in some patients, using intranasal steroid spray (e.g. Flonase or Nasocort or Fluticasone) 1 hour before bedtime and/or before putting on CPAP mask can help tolerate breathing through the mask.    Don't give up after a few attempts--some patients adjust quickly, while some patients need 3-4 weeks (or sometimes even longer) to be accustomed to CPAP therapy.  Contact your sleep medicine specialist if you have a significant change in weight since this may affect your pressure.    You can also go to the following EDUCATION WEBSITES for further information:   American Academy of Sleep Medicine http://sleepeducation.org  National Sleep Foundation: https://sleepfoundation.org  American Sleep Apnea Association: https://www.sleepapnea.org (for patients with sleep apnea)  Narcolepsy Network: https://www.narcolepsynetwork.org (for patients with narcolepsy)  [employer]: https://www.wakeupnarcolepsy.org (for patients with narcolepsy)  Hypersomnia Foundation: https://www.hypersomniafoundation.org (for patients with idiopathic hypersomnia)  RLS foundation: https://www.rls.org (for patients with restless leg syndrome)    IMPORTANT INFORMATION     Call 911 for medical emergencies.  Our offices are generally open from Monday-Friday, 8 am - 5 pm.   There are no supporting services by either the sleep doctors or their staff on weekends and Holidays, or after 5 PM on weekdays.   If you need to get in touch  with me, you may either call my office number or you can use Lessons Only.  If a referral for a test, for CPAP, or for another specialist was made, and you have not heard about scheduling this within a week, please call scheduling at [phone]).  If you are unable to make your appointment for clinic or an overnight study, kindly call the office or sleep testing center at least 48 hours in advance to cancel and reschedule.  If you are on CPAP, please bring your device's card and/or the device to each clinic appointment.   In case of problems with PAP machine or mask interface, please contact your DME (Durable Medical Equipment) company first. DME is the company who provides you the machine and/or PAP supplies.       PRESCRIPTIONS     We require 7 days advanced notice for prescription refills. If we do not receive the request in this time, we cannot guarantee that your medication will be refilled in time.    IMPORTANT PHONE NUMBERS     Sleep Medicine Clinic Fax: [phone]  Appointments (for Pediatric Sleep Clinic): [phone] - option 1  Appointments (for Adult Sleep Clinic): [phone] - option 2  Appointments (For Sleep Studies): [phone] - option 3  Behavioral Sleep Medicine: [phone]  Sleep Surgery: [phone]  Nutrition Service: [phone]  Weight management clinics with endocrinology: [phone]  Bariatric Services: [phone] (includes weight loss medications and weight loss surgery)  [facility]: [phone] (offers holistic approaches to weight management)  ENT (Otolaryngology): [phone]  Headache Clinic (Neurology): [phone]  Neurology: [phone]  Psychiatry: [phone]  Pulmonary Function Testing (PFT) Center: [phone]  Pulmonary Medicine: [phone]  [employer] (DME): [phone]      OUR SLEEP TESTING LOCATIONS     Our team will contact you to schedule your sleep study, however, you can contact us as  "follow:  Main Phone Line (scheduling only): [phone]), option 3  Adult and Pediatric Locations   [place] (6 years and older): [facility] - 4th floor ([address] [facility]) After hours line: [phone]  [facility] (Main campus: All ages): [facility], 6th floor. After hours line: [phone]   [place] (5 years and older; younger considered on case-by-case basis): [address]; [address], [address]. Scheduling  After hours line: [phone]   [place] (6 years and older): [address]; [address]; [address]   [place] (6 years and older): [address] [facility]  After hours line: [phone]   [place] (13 years and older) in [place]: [address], 2nd floor  After hours line: [phone]   [place] (13 year and older): [address]  After hours line: [phone]     Adult Only Locations:   [place] (18 years and older): [address] [facility], 2nd floor   [place] (18 years and older): [address] [facility]; 4th floor  After hours line: [phone]   [place] (18 years and older) at [facility]: [id number] [place] [address]  After hours line: [phone]     CONTACTING YOUR SLEEP MEDICINE PROVIDER AND SLEEP TEAM      For issues with your machine or mask interface, please call your DME provider first. DME stands for durable medical company. DME is the company who provides you the machine and/or PAP supplies / accessories.   To schedule, cancel, or reschedule SLEEP STUDY APPOINTMENTS, please call the Main Phone Line at [phone] - option 3.   To schedule, cancel, or reschedule CLINIC APPOINTMENTS, you can do it in \"MyChart\", call [phone] to speak with my  (Juliet Pham), or call the Main Phone Line at [phone] - option 2  For CLINICAL QUESTIONS or MEDICATION REFILLS, please call direct line for Adult Sleep Nurses at [phone]. " "  Lastly, you can also send a message directly to your provider through \"My Chart\", which is the email service through your  Records Account: [email or web address]       Here at [facility], we wish you a restful sleep!    "

## 2024-05-17 DIAGNOSIS — Z96.652 HISTORY OF TOTAL LEFT KNEE REPLACEMENT: ICD-10-CM

## 2024-05-21 ENCOUNTER — OFFICE VISIT (OUTPATIENT)
Dept: ORTHOPEDIC SURGERY | Facility: CLINIC | Age: 69
End: 2024-05-21
Payer: MEDICARE

## 2024-05-21 ENCOUNTER — HOSPITAL ENCOUNTER (OUTPATIENT)
Dept: RADIOLOGY | Facility: HOSPITAL | Age: 69
Discharge: HOME | End: 2024-05-21
Payer: MEDICARE

## 2024-05-21 DIAGNOSIS — Z96.652 HISTORY OF TOTAL LEFT KNEE REPLACEMENT: Primary | ICD-10-CM

## 2024-05-21 DIAGNOSIS — Z96.652 HISTORY OF TOTAL LEFT KNEE REPLACEMENT: ICD-10-CM

## 2024-05-21 PROCEDURE — 73562 X-RAY EXAM OF KNEE 3: CPT | Mod: LT

## 2024-05-21 PROCEDURE — 1123F ACP DISCUSS/DSCN MKR DOCD: CPT | Performed by: SPECIALIST

## 2024-05-21 PROCEDURE — 3008F BODY MASS INDEX DOCD: CPT | Performed by: SPECIALIST

## 2024-05-21 PROCEDURE — 99214 OFFICE O/P EST MOD 30 MIN: CPT | Performed by: SPECIALIST

## 2024-05-21 PROCEDURE — 1160F RVW MEDS BY RX/DR IN RCRD: CPT | Performed by: SPECIALIST

## 2024-05-21 PROCEDURE — 1159F MED LIST DOCD IN RCRD: CPT | Performed by: SPECIALIST

## 2024-05-21 NOTE — LETTER
May 21, 2024     Patient: Michael Babb   YOB: 1955   Date of Visit: 5/21/2024       To Whom It May Concern:    It is my medical opinion that Michael Babb  Needs to continue 8 hour work days, 40 hour work week due to multiple joint replacements and osteoarthritis .    If you have any questions or concerns, please don't hesitate to call.         Sincerely,        Baldo Garnica MD    CC: No Recipients

## 2024-05-21 NOTE — PROGRESS NOTES
1 year follow up Left total knee TKA   No concerns or issues, patient did have a right total hip 2 years and is experiencing pain every once in a while with no injury.  Joint replacement areas continued to do well but he has low back and other joint region pains, inheriting diffuse osteoarthritis.    The patient is here for follow-up of their left total knee arthroplasty.  The patient has minimal knee pain.  The patient has no mechanical symptoms.  The patient is approximately 1 year postop.    Patient denies constitutional symptoms, ROS otherwise negative    Physical examination:    Examination of the left knee  The incision is well-healed  Minimal erythema, ecchymosis, and warmth, appropriate for postoperative timeframe.  Minimal knee joint effusion, appropriate for timeframe.  Gait is intact without significant pathologic components  Range of motion: 0-130  No significant knee instability in full extension and mid flexion.  Calf is soft, Homans negative  The patient has intact ankle dorsiflexion and plantarflexion.  Neurovascular exam otherwise within normal      Radiographs: Left knee with stable total knee arthroplasty implant no bone implant lucencies negative effusion good alignment    Impression:  Status post left total  knee arthroplasty    Plan:  Continue activities as tolerated.  Because of his diffuse osteoporosis he would not be appropriate to work beyond an 8-hour day or certainly a 40-hour workweek.  That will be an ongoing restriction for 6 months.  Follow-up in 6 months or sooner if any new issues arise.  Discussed the continued importance of prophylactic dental antibiotics  All questions answered       This note was dictated using speech recognition software and was not corrected for spelling or grammatical errors

## 2024-05-22 ENCOUNTER — TELEPHONE (OUTPATIENT)
Dept: ORTHOPEDIC SURGERY | Facility: CLINIC | Age: 69
End: 2024-05-22
Payer: MEDICARE

## 2024-05-22 NOTE — TELEPHONE ENCOUNTER
Patient was seen yesterday and his letter needs to have dates on it. His employer needs to know how long his restrictions are for. Patient would like the new letter mailed to him and a confirmation call.

## 2024-05-22 NOTE — TELEPHONE ENCOUNTER
Letter done. Patient notified. He will try to print it from his my chart, if not he will stop in the office and  a copy.  It is attached to office visit on 5/21/24.

## 2024-05-22 NOTE — LETTER
May 22, 2024     Patient: Michael Babb   YOB: 1955   Date of Visit: 5/22/2024       To Whom It May Concern:    It is my medical opinion that Michael Babb Needs to continue 8 hour work days, 40 hour work week until his next appointment on 11/19/2024,  due to multiple joint replacements and osteoarthritis.    If you have any questions or concerns, please don't hesitate to call.         Sincerely,        Baldo Garnica MD    CC: No Recipients

## 2024-06-04 ENCOUNTER — TELEPHONE (OUTPATIENT)
Dept: PRIMARY CARE | Facility: CLINIC | Age: 69
End: 2024-06-04
Payer: MEDICARE

## 2024-06-04 DIAGNOSIS — J06.9 ACUTE URI OF MULTIPLE SITES: Primary | ICD-10-CM

## 2024-06-04 RX ORDER — IPRATROPIUM BROMIDE 42 UG/1
2 SPRAY, METERED NASAL 4 TIMES DAILY
Qty: 15 ML | Refills: 12 | Status: SHIPPED | OUTPATIENT
Start: 2024-06-04 | End: 2024-06-14

## 2024-06-04 RX ORDER — BROMPHENIRAMINE MALEATE, PSEUDOEPHEDRINE HYDROCHLORIDE, AND DEXTROMETHORPHAN HYDROBROMIDE 2; 30; 10 MG/5ML; MG/5ML; MG/5ML
10 SYRUP ORAL 4 TIMES DAILY PRN
Qty: 240 ML | Refills: 0 | Status: SHIPPED | OUTPATIENT
Start: 2024-06-04 | End: 2024-06-14

## 2024-06-04 NOTE — TELEPHONE ENCOUNTER
He called to see if you would send something in for him he is having stuffing nose, coughing, please advise as to what he can do. He did stop last night to get a allergy nose spray didn't do anything. Pharmacy [facility] in [place]

## 2024-06-19 DIAGNOSIS — I10 BENIGN ESSENTIAL HYPERTENSION: ICD-10-CM

## 2024-06-19 RX ORDER — METOPROLOL SUCCINATE 100 MG/1
100 TABLET, EXTENDED RELEASE ORAL 2 TIMES DAILY
Qty: 180 TABLET | Refills: 3 | Status: SHIPPED | OUTPATIENT
Start: 2024-06-19

## 2024-06-19 NOTE — TELEPHONE ENCOUNTER
Needs a refill on his Metoprolol 100 mg takes it 2 times a day please send to [facility] in [place]

## 2024-06-26 ENCOUNTER — TELEPHONE (OUTPATIENT)
Dept: PRIMARY CARE | Facility: CLINIC | Age: 69
End: 2024-06-26
Payer: COMMERCIAL

## 2024-06-26 DIAGNOSIS — I10 BENIGN ESSENTIAL HYPERTENSION: ICD-10-CM

## 2024-06-26 RX ORDER — TRIAMTERENE/HYDROCHLOROTHIAZID 37.5-25 MG
1 TABLET ORAL 2 TIMES DAILY
Qty: 90 TABLET | Refills: 3 | Status: SHIPPED | OUTPATIENT
Start: 2024-06-26

## 2024-06-26 NOTE — TELEPHONE ENCOUNTER
Med Refill   triamterene-hydrochlorothiazid (Maxzide-25) 37.5-25 mg tablet [[id number]]     [id number] - [place], [place] - [address] [address] [address]  Phone: [phone]  Fax: [phone]  ROBYN #: --     Patient is out of meds

## 2024-07-05 ENCOUNTER — TELEPHONE (OUTPATIENT)
Dept: PRIMARY CARE | Facility: CLINIC | Age: 69
End: 2024-07-05
Payer: COMMERCIAL

## 2024-07-05 DIAGNOSIS — E29.1 TESTICULAR HYPERGONADOTROPIC HYPOGONADISM: ICD-10-CM

## 2024-07-05 RX ORDER — TESTOSTERONE CYPIONATE 200 MG/ML
200 INJECTION, SOLUTION INTRAMUSCULAR
Qty: 2 ML | Refills: 2 | Status: SHIPPED | OUTPATIENT
Start: 2024-07-05 | End: 2024-10-03

## 2024-07-08 ENCOUNTER — LAB (OUTPATIENT)
Dept: LAB | Facility: LAB | Age: 69
End: 2024-07-08
Payer: COMMERCIAL

## 2024-07-08 DIAGNOSIS — E29.1 TESTICULAR HYPERGONADOTROPIC HYPOGONADISM: ICD-10-CM

## 2024-07-08 DIAGNOSIS — N40.0 BENIGN PROSTATIC HYPERPLASIA, UNSPECIFIED WHETHER LOWER URINARY TRACT SYMPTOMS PRESENT: ICD-10-CM

## 2024-07-08 LAB
ERYTHROCYTE [DISTWIDTH] IN BLOOD BY AUTOMATED COUNT: 13.5 % (ref 11.5–14.5)
HCT VFR BLD AUTO: 49.3 % (ref 41–52)
HGB BLD-MCNC: 16.3 G/DL (ref 13.5–17.5)
MCH RBC QN AUTO: 31.6 PG (ref 26–34)
MCHC RBC AUTO-ENTMCNC: 33.1 G/DL (ref 32–36)
MCV RBC AUTO: 96 FL (ref 80–100)
NRBC BLD-RTO: 0 /100 WBCS (ref 0–0)
PLATELET # BLD AUTO: 216 X10*3/UL (ref 150–450)
PSA SERPL-MCNC: 0.58 NG/ML
RBC # BLD AUTO: 5.16 X10*6/UL (ref 4.5–5.9)
WBC # BLD AUTO: 7.9 X10*3/UL (ref 4.4–11.3)

## 2024-07-08 PROCEDURE — 84153 ASSAY OF PSA TOTAL: CPT

## 2024-07-08 PROCEDURE — 36415 COLL VENOUS BLD VENIPUNCTURE: CPT

## 2024-07-08 PROCEDURE — 85027 COMPLETE CBC AUTOMATED: CPT

## 2024-07-08 PROCEDURE — 84402 ASSAY OF FREE TESTOSTERONE: CPT

## 2024-07-08 NOTE — PROGRESS NOTES
[facility]  [address]  [facility]  [address]  [facility] [address]     [facility]  Follow Up Visit Note      Subjective   Patient ID: Michael Babb is a 68 y.o. male with past medical history significant for Obesity, Hypertension, Anxiety, and Obstructive sleep apnea.    7/16/24: UPDATED: The patient returned to the clinic to review his pap compliance after adjusting the pressure setting. He bought his old machine, which he purchased himself many years ago, and would like to get a new one from the insurance company. His old pap compliance for over four hours usage rate was 100 %, and His ESS  is  0 today. He understood and agreed to do a home sleep study and confirm his OBSTRUCTIVE SLEEP APNEA diagnosis to get a new pap continuously to treat his SHERMAN.      5/9/24:  He brought his Yoshi pap to the clinic. The previous setting was 15 CWP; I adjusted it to 18 CWP in the clinic because it needed more pressure and woke him up at night. He felt comfortable. I also provided a small sample mask of Yoshi's dreamwear under the nose small size in the clinic. His Ess is one today.        3/19/2024: The Patient is alone today for a comprehensive sleep medicine evaluation due to sleep apnea on pap. Today, he came to the office to ask for another pap because he has faithfully used pap and needs a new machine. He has a provider at [facility]. However, the provider moves out and needs to find another provider to establish care. He recently felt an extremely dry mouth when he woke up and required more pressure to wake him up at night. ESS: 0 JAVIER: 10, and neck circumference is 16 inches today.     HPI  The Patient had been having these symptoms for the past 25 years. The Patient was diagnosed with SHERMAN in 2019 by [facility] and has started on CPAP since then. Currently on auto-CPAP 18 cm H2O with EPR/Flex 1 and nasal pillow mask  through [facility]. The Patient has been using the machine every night.      SLEEP STUDY HISTORY: (personally reviewed raw data such as interpretation report, data sheet, hypnogram, and titration table if available and applicable)  4/16/2019: pap titration: AHI : 82.7, [place]: 77%, consider CPAP 15cm H2O      SLEEP-WAKE SCHEDULE  Bedtime: 10 PM on weekdays, 11 PM on weekends  Subjective sleep latency: 10 minutes  Problems falling asleep: no  Number of awakenings: 2-3 times per night spontaneously for urination  Falls back asleep in 10 minutes  Problems staying asleep: no  Final wake time: 5:30 AM on weekdays, 7 AM on weekends  Out of bed time: 5:40 AM on weekdays, 7 AM on weekends  Shift work: no  Naps: no  Average sleep duration (excluding naps): 7 hours     SLEEP ENVIRONMENT  Sleep location: bed  Sleep status: sleeps with girl friend  Room is dark:  Yes  Room is quiet: Yes  Room is cool: Yes  Bed comfort: good     SLEEP HABITS:   Activities before bedtime: watch TV  Activities in bed: no   Preferred sleep position: back     SLEEP ROS:  Night symptoms: POSITIVE for snoring before PAP but not now and mouth breathing  Morning symptoms: POSITIVE for unrefreshing sleep and morning dry mouth  Daytime symptoms POSITIVE for excessive daytime sleepiness before PAP but not now  Hypersomnia / narcolepsy symptoms: Patient denies symptoms of a hypersomnolence disorder such as sleep paralysis, sleep-related hallucinations, recurrent sleep attacks, automatic behaviors, and cataplexy.   RLS symptoms: Patient denies RLS symptoms.  Movements in sleep: Patient denies problematic movements in sleep.  Parasomnia symptoms: Patient denies symptoms of parasomnia.     WEIGHT: stable     REVIEW OF SYSTEMS: All other systems have been reviewed and are negative.     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 No significant changes unless documented in the pertinent chart section. Updates made when necessary.          Objective   Vitals:    07/16/24 1411   BP: 128/73   Pulse: 71   Resp: 16   Temp: 36.9 °C (98.4 °F)   SpO2: 98%         Physical Exam  Constitutional:alert and oriented to time, place, and person  Lungs: Clear to auscultation bilateral, no rales  Heart: Regular rate and rhythm, no murmurs        PAP Adherence:  DURABLE MEDICAL EQUIPMENT COMPANY: [facility]  Machine: THERAPY: [facility] PRESSURE SETTINGS: 18 cm H2O  Mask: nasal pillow mask  Issues with therapy: ISSUES WITH THERAPY:needs more pressure  Benefits with PAP: PERCEIVED BENEFITS OF PAP: refreshing sleep decreased episodes of nocturia better sleep quality     A PAP adherence download was obtained and data was reviewed personally today in clinic. (see scanned document in EPIC)           Assessment/Plan   Michael Babb is a 68 y.o. male who is seen to evaluate for severe obstructive sleep apnea. Risk factors of sleep apnea as well as cardiometabolic and neurocognitive sequelae associated with untreated sleep apnea were also discussed. Lastly, patient was advised to avoid driving vehicle or operating heavy machinery when sleepy.      Michael Babb with the following problems:     # OBSTRUCTIVE SLEEP APNEA:  -Proceed a Home Sleep Study to confirm his diagnosis for getting a new pap.  -Adjust to 20 cmH20 in the clinic, and send a adjsuted pressure to [facility]. I also order annual supplies via [facility].  -Sleep apnea and PAP therapy education were provided at length in the clinic today. Michael Babb verbalized understanding.  -Emphasized diet, exercise, and weight loss in the clinic, as were non-supine sleep, avoiding alcohol in  the late evening, and driving or operating heavy machinery when sleepy.  -Michael Campuzanooverbalizes understanding of the above instructions and risks.     # HYPERTENSION:  -Blood pressure was 125/77 today.  -Denies headache, palpitation, and syncope in the clinic.  -Follows with PCP/ Cardiology     # OBESITY :  -with a BMI of 32.49. Michael Babb most recent Bicarbonate was 29            Bicarbonate   Date Value Ref Range Status   10/04/2023 29 21 - 32 mmol/L Final   -Encourage to have regular exercise to manage weight well.     # XEROSTOMIA:  -Instruct Michael Duque purchase the Biotene gel to ease the dry mouth symptom,        RTC 2-3 weeks after sleep study         All of patient's questions were answered. He verbalizes understanding and agreement with my assessment and plan.

## 2024-07-11 LAB
TESTOSTERONE FREE (CHAN): 129.9 PG/ML (ref 35–155)
TESTOSTERONE,TOTAL,LC-MS/MS: 656 NG/DL (ref 250–1100)

## 2024-07-16 ENCOUNTER — OFFICE VISIT (OUTPATIENT)
Dept: SLEEP MEDICINE | Facility: CLINIC | Age: 69
End: 2024-07-16
Payer: COMMERCIAL

## 2024-07-16 VITALS
TEMPERATURE: 98.4 F | OXYGEN SATURATION: 98 % | WEIGHT: 220 LBS | BODY MASS INDEX: 32.58 KG/M2 | HEIGHT: 69 IN | RESPIRATION RATE: 16 BRPM | SYSTOLIC BLOOD PRESSURE: 128 MMHG | DIASTOLIC BLOOD PRESSURE: 73 MMHG | HEART RATE: 71 BPM

## 2024-07-16 DIAGNOSIS — E66.9 OBESITY (BMI 30-39.9): ICD-10-CM

## 2024-07-16 DIAGNOSIS — G47.33 OSA (OBSTRUCTIVE SLEEP APNEA): Primary | ICD-10-CM

## 2024-07-16 DIAGNOSIS — I10 HYPERTENSION, UNSPECIFIED TYPE: ICD-10-CM

## 2024-07-16 PROCEDURE — 3008F BODY MASS INDEX DOCD: CPT

## 2024-07-16 PROCEDURE — 3074F SYST BP LT 130 MM HG: CPT

## 2024-07-16 PROCEDURE — 1159F MED LIST DOCD IN RCRD: CPT

## 2024-07-16 PROCEDURE — 99213 OFFICE O/P EST LOW 20 MIN: CPT

## 2024-07-16 PROCEDURE — 3078F DIAST BP <80 MM HG: CPT

## 2024-07-16 PROCEDURE — 1036F TOBACCO NON-USER: CPT

## 2024-07-16 PROCEDURE — 1160F RVW MEDS BY RX/DR IN RCRD: CPT

## 2024-07-16 PROCEDURE — G2211 COMPLEX E/M VISIT ADD ON: HCPCS

## 2024-07-16 PROCEDURE — 1123F ACP DISCUSS/DSCN MKR DOCD: CPT

## 2024-07-16 ASSESSMENT — SLEEP AND FATIGUE QUESTIONNAIRES
ESS-CHAD TOTAL SCORE: 0
SITING INACTIVE IN A PUBLIC PLACE LIKE A CLASS ROOM OR A MOVIE THEATER: WOULD NEVER DOZE
HOW LIKELY ARE YOU TO NOD OFF OR FALL ASLEEP WHILE WATCHING TV: WOULD NEVER DOZE
HOW LIKELY ARE YOU TO NOD OFF OR FALL ASLEEP WHILE LYING DOWN TO REST IN THE AFTERNOON WHEN CIRCUMSTANCES PERMIT: WOULD NEVER DOZE
HOW LIKELY ARE YOU TO NOD OFF OR FALL ASLEEP WHILE SITTING QUIETLY AFTER LUNCH WITHOUT ALCOHOL: WOULD NEVER DOZE
HOW LIKELY ARE YOU TO NOD OFF OR FALL ASLEEP IN A CAR, WHILE STOPPED FOR A FEW MINUTES IN TRAFFIC: WOULD NEVER DOZE
HOW LIKELY ARE YOU TO NOD OFF OR FALL ASLEEP WHILE SITTING AND TALKING TO SOMEONE: WOULD NEVER DOZE
HOW LIKELY ARE YOU TO NOD OFF OR FALL ASLEEP WHILE SITTING AND READING: WOULD NEVER DOZE
HOW LIKELY ARE YOU TO NOD OFF OR FALL ASLEEP WHEN YOU ARE A PASSENGER IN A CAR FOR AN HOUR WITHOUT A BREAK: WOULD NEVER DOZE

## 2024-07-16 NOTE — PATIENT INSTRUCTIONS
[facility]  [address]  [facility]  [address]  [facility] [address]       Thank you for coming to the Sleep Medicine Clinic today! Your sleep medicine provider today was: JOSS Bustamante Below is a summary of your treatment plan, patient education, other important information, and our contact numbers.    Dear Michael Babb,    Thank you for visiting  Sleep Medicine Clinic !     1. We will proceed with a HOME sleep study to check your risk of sleep apnea. The lab will call you and schedule it for you.     2. Please do not drive when you are sleepy and continue practicing the sleep hygiene  as discussed in clinic today.     3. FOR QUESTIONS AND CONCERNS:   a) : To schedule, cancel, or reschedule SLEEP STUDY appointments, please call [phone]  b): Please call my office with issues or questions: [phone] ([place]); [phone] ([facility]); [phone] ([facility])    If you have a CPAP or BiPAP machine at home, please bring the unit and all accessories including the power cord to your appointments unless I tell you otherwise. Please have knowledge of the DME company you worked with to receive your PAP device. If you have copies of any previous sleep testing completed outside of , please bring with you to clinic as well. This information will make our visits more productive.     If you are new to CPAP or BiPAP, please note the minimum usage insurance requires to continuing coverage for the equipment as noted by your DME company. Please discuss equipment issues (PAP unit, mask fit, humidification, etc.) with your DME company first.       In the event that you are running more than 10 minutes late to your appointment, I will kindly ask you to reschedule. Thanks.      TREATMENT PLAN     Follow-up Appointment:   Follow-up in 2-3 weeks after sleep study.    PATIENT EDUCATION     OBSTRUCTIVE SLEEP APNEA (SHERMAN) is a sleep  disorder where your upper airway muscles relax during sleep and the airway intermittently and repetitively narrows and collapses leading to partially blocked airway (hypopnea) or completely blocked airway (apnea) which, in turn, can disrupt breathing in sleep, lower oxygen levels while you sleep and cause night time wakings. Because both apnea and hypopnea may cause higher carbon dioxide or low oxygen levels, untreated SHERMAN can lead to heart arrhythmia, elevation of blood pressure, and make it harder for the body to consolidate memory and facilitate metabolism (leading to higher blood sugars at night). Frequent partial arousals occur during sleep resulting in sleep deprivation and daytime sleepiness. SHERMAN is associated with an increased risk of cardiovascular disease, stroke, hypertension, and insulin resistance. Moreover, untreated SHERMAN with excessive daytime sleepiness can increase the risk of motor vehicular accidents.    Below are conservative strategies for SHERMAN regardless of SHERMAN severity are:   Positional therapy - Avoid sleeping on your back.   Healthy diet and regular exercise to optimize weight is highly encouraged.   Avoid alcohol late in the evening and sedative-hypnotics as these substances can make sleep apnea worse.   Improve breathing through the nose with intranasal steroid spray, saline rinse, or antihistamines    Safety: Avoid driving vehicle and operating heavy equipment while sleepy. Drowsy driving may lead to life-threatening motor vehicle accidents. A person driving while sleepy is 5 times more likely to have an accident. If you feel sleepy, pull over and take a short power nap (sleep for less than 30 minutes). Otherwise, ask somebody to drive you.    Treatment options for sleep apnea include weight management, positional therapy, Positive Airway Therapy (PAP) therapy, oral appliance therapy, hypoglossal nerve stimulator (Inspire) and select airway surgeries.    Sleep Testing for sleep apnea: The  best and ideal way to check out if you have sleep apnea is to do an overnight sleep study in the sleep laboratory. Alternatively, a home sleep apnea test can also be done depending on your insurance and risk factors.     If you are having a home sleep apnea test, kindly allot 1 hour during pickup of the testing kit as you will have to complete paperwork and listen to the sleep technician for in-person on-the-spot demonstration and instructions on how to hook up the testing kit at home. Do the test for 1 day and start off with sleeping on your back. If you sleep on your side in the middle of night or you have always been a side or stomach-sleeper, it is ok as long as you have some time on your back and off-back.     If you are having an overnight in-lab sleep study, please make sure to bring toiletries, a comfy pillow, additional warm blankets, and any nighttime medications (include as-needed inhaler, pain pill, etc) that you may regularly take. Also, be sure to eat dinner before you arrive as we generally do not provide meals inside the sleep testing center. Lastly, in order to fall asleep faster in the sleep testing center, we advise patients to wake up 2 hours earlier on the morning of scheduled testing and avoid napping 2 days prior testing. Sometimes, your sleep provider may prescribe a sleep aid to be taken at lights out in the sleep testing center. If you are taking a sleep aid, consider having somebody pick you up after the sleep testing.    Overnight sleep studies may be scheduled on a weekday or weekend. We also perform daytime testing for shift workers on a case-by-case basis.    Once you have booked an appointment to do the sleep study, please contact my office for follow-up visit to discuss results.    On the other hand, if you have any of the following, please consider calling the sleep testing center to RESCHEDULE your sleep study appointment:  If you tested positive for COVID within 10 days of your  sleep study appointment.  If you were exposed to somebody who was confirmed for COVID within 10 days of your sleep study appointment and now you are having symptoms of possible COVID  If you have fever>100F or any acute symptoms that you think will lead to poor sleep during testing (e.g. new or worsening stuffy nose not relieved by steroid nasal spray)  If you have traveled domestically or internationally in the last month and now you are having symptoms of possible COVID    You can also go to the following EDUCATION WEBSITES for further information:   American Academy of Sleep Medicine http://sleepeducation.org  National Sleep Foundation: https://sleepfoundation.org  American Sleep Apnea Association: https://www.sleepapnea.org (for patients with sleep apnea)  Narcolepsy Network: https://www.narcolepsynetwork.org (for patients with narcolepsy)  [employer]: https://www.Allux Medicalcolepsy.org (for patients with narcolepsy)  Hypersomnia Foundation: https://www.hypersomniafoundation.org (for patients with idiopathic hypersomnia)  RLS foundation: https://www.rls.org (for patients with restless leg syndrome)    IMPORTANT INFORMATION     Call 911 for medical emergencies.  Our offices are generally open from Monday-Friday, 8 am - 5 pm.   There are no supporting services by either the sleep doctors or their staff on weekends and Holidays, or after 5 PM on weekdays.   If you need to get in touch with me, you may either call my office number or you can use [employer].  If a referral for a test, for CPAP, or for another specialist was made, and you have not heard about scheduling this within a week, please call scheduling at [phone] ([phone]).  If you are unable to make your appointment for clinic or an overnight study, kindly call the office or sleep testing center at least 48 hours in advance to cancel and reschedule.  If you are on CPAP, please bring your device's card and/or the device to each clinic appointment.    In case of problems with PAP machine or mask interface, please contact your DME (Durable Medical Equipment) company first. DME is the company who provides you the machine and/or PAP supplies.       PRESCRIPTIONS     We require 7 days advanced notice for prescription refills. If we do not receive the request in this time, we cannot guarantee that your medication will be refilled in time.    IMPORTANT PHONE NUMBERS     Sleep Medicine Clinic Fax: [phone]  Appointments (for Pediatric Sleep Clinic): [phone] - option 1  Appointments (for Adult Sleep Clinic): [phone] - option 2  Appointments (For Sleep Studies): [phone] - option 3  Behavioral Sleep Medicine: [phone]  Sleep Surgery: [phone]  Nutrition Service: [phone]  Weight management clinics with endocrinology: [phone]  Bariatric Services: [phone] (includes weight loss medications and weight loss surgery)  [facility]: [phone] (offers holistic approaches to weight management)  ENT (Otolaryngology): [phone]  Headache Clinic (Neurology): [phone]  Neurology: [phone]  Psychiatry: [phone]  Pulmonary Function Testing (PFT) Center: [phone]  Pulmonary Medicine: [phone]  Medical Service Company (DME): [phone]      OUR SLEEP TESTING LOCATIONS     Our team will contact you to schedule your sleep study, however, you can contact us as follow:  Main Phone Line (scheduling only): [phone]), option 3  Adult and Pediatric Locations  [facility] (6 years and older): [facility] - 4th floor ([address] [place] [address]) After hours line: [phone]  [facility] (Main campus: All ages): [facility], 6th floor. After hours line: [phone]  [facility] (5 years and older; younger considered on case-by-case basis): [address] [facility]; [address], [address]. Scheduling  After hours line:  "[phone]   [place] (6 years and older): [address]; [address]; [address]   [place] (6 years and older): [address], [address]  After hours line: [phone]   [place] (13 years and older) in [place]: [address], 2nd floor  After hours line: [phone]   [place] (13 year and older): [address]  After hours line: [phone]     Adult Only Locations:   [place] (18 years and older): [address] [facility] [facility], 2nd floor   [place] (18 years and older): [address] [facility]; 4th floor  After hours line: [phone]  [facility] (18 years and older) at [place]: [id number] [place] [address]  After hours line: [phone]     CONTACTING YOUR SLEEP MEDICINE PROVIDER AND SLEEP TEAM      For issues with your machine or mask interface, please call your DME provider first. DME stands for durable medical company. DME is the company who provides you the machine and/or PAP supplies / accessories.   To schedule, cancel, or reschedule SLEEP STUDY APPOINTMENTS, please call the Main Phone Line at [phone] - option 3.   To schedule, cancel, or reschedule CLINIC APPOINTMENTS, you can do it in \"MyChart\", call [phone] to speak with my  (Juliet Pham), or call the Main Phone Line at [phone] - option 2  For CLINICAL QUESTIONS or MEDICATION REFILLS, please call direct line for Adult Sleep Nurses at [phone].   Lastly, you can also send a message directly to your provider through \"My Chart\", which is the email service through your  Records Account: [email or web address]       Here at [facility], we wish you a restful sleep!   "

## 2024-07-17 ENCOUNTER — APPOINTMENT (OUTPATIENT)
Dept: PRIMARY CARE | Facility: CLINIC | Age: 69
End: 2024-07-17
Payer: MEDICARE

## 2024-07-17 VITALS
OXYGEN SATURATION: 95 % | BODY MASS INDEX: 32.73 KG/M2 | HEART RATE: 79 BPM | WEIGHT: 221 LBS | SYSTOLIC BLOOD PRESSURE: 130 MMHG | DIASTOLIC BLOOD PRESSURE: 86 MMHG | HEIGHT: 69 IN

## 2024-07-17 DIAGNOSIS — K21.9 GASTROESOPHAGEAL REFLUX DISEASE WITHOUT ESOPHAGITIS: ICD-10-CM

## 2024-07-17 DIAGNOSIS — E29.1 TESTICULAR HYPERGONADOTROPIC HYPOGONADISM: ICD-10-CM

## 2024-07-17 DIAGNOSIS — I10 BENIGN ESSENTIAL HYPERTENSION: ICD-10-CM

## 2024-07-17 DIAGNOSIS — J30.9 ALLERGIC RHINITIS, UNSPECIFIED SEASONALITY, UNSPECIFIED TRIGGER: ICD-10-CM

## 2024-07-17 DIAGNOSIS — Z00.00 ANNUAL PHYSICAL EXAM: Primary | ICD-10-CM

## 2024-07-17 DIAGNOSIS — R14.3 FLATULENCE: ICD-10-CM

## 2024-07-17 PROCEDURE — 99397 PER PM REEVAL EST PAT 65+ YR: CPT | Performed by: NURSE PRACTITIONER

## 2024-07-17 PROCEDURE — 3075F SYST BP GE 130 - 139MM HG: CPT | Performed by: NURSE PRACTITIONER

## 2024-07-17 PROCEDURE — 3079F DIAST BP 80-89 MM HG: CPT | Performed by: NURSE PRACTITIONER

## 2024-07-17 PROCEDURE — 1036F TOBACCO NON-USER: CPT | Performed by: NURSE PRACTITIONER

## 2024-07-17 PROCEDURE — 99214 OFFICE O/P EST MOD 30 MIN: CPT | Performed by: NURSE PRACTITIONER

## 2024-07-17 PROCEDURE — 1159F MED LIST DOCD IN RCRD: CPT | Performed by: NURSE PRACTITIONER

## 2024-07-17 PROCEDURE — 1160F RVW MEDS BY RX/DR IN RCRD: CPT | Performed by: NURSE PRACTITIONER

## 2024-07-17 PROCEDURE — 1123F ACP DISCUSS/DSCN MKR DOCD: CPT | Performed by: NURSE PRACTITIONER

## 2024-07-17 PROCEDURE — 3008F BODY MASS INDEX DOCD: CPT | Performed by: NURSE PRACTITIONER

## 2024-07-17 RX ORDER — PANTOPRAZOLE SODIUM 40 MG/1
40 TABLET, DELAYED RELEASE ORAL DAILY
Qty: 90 TABLET | Refills: 3 | Status: SHIPPED | OUTPATIENT
Start: 2024-07-17 | End: 2025-07-17

## 2024-07-17 RX ORDER — CETIRIZINE HYDROCHLORIDE 10 MG/1
10 TABLET ORAL EVERY MORNING
Qty: 90 TABLET | Refills: 3 | Status: SHIPPED | OUTPATIENT
Start: 2024-07-17 | End: 2025-07-17

## 2024-07-17 RX ORDER — MONTELUKAST SODIUM 10 MG/1
10 TABLET ORAL NIGHTLY
Qty: 90 TABLET | Refills: 3 | Status: SHIPPED | OUTPATIENT
Start: 2024-07-17 | End: 2025-07-17

## 2024-07-17 RX ORDER — SIMETHICONE 125 MG
125 CAPSULE ORAL 4 TIMES DAILY
Qty: 360 CAPSULE | Refills: 0 | Status: SHIPPED | OUTPATIENT
Start: 2024-07-17

## 2024-07-17 ASSESSMENT — PATIENT HEALTH QUESTIONNAIRE - PHQ9
1. LITTLE INTEREST OR PLEASURE IN DOING THINGS: NOT AT ALL
SUM OF ALL RESPONSES TO PHQ9 QUESTIONS 1 AND 2: 0
2. FEELING DOWN, DEPRESSED OR HOPELESS: NOT AT ALL

## 2024-07-17 ASSESSMENT — ENCOUNTER SYMPTOMS
DEPRESSION: 0
LOSS OF SENSATION IN FEET: 0
OCCASIONAL FEELINGS OF UNSTEADINESS: 0
RHINORRHEA: 1
EYE ITCHING: 1

## 2024-07-17 ASSESSMENT — COLUMBIA-SUICIDE SEVERITY RATING SCALE - C-SSRS
2. HAVE YOU ACTUALLY HAD ANY THOUGHTS OF KILLING YOURSELF?: NO
1. IN THE PAST MONTH, HAVE YOU WISHED YOU WERE DEAD OR WISHED YOU COULD GO TO SLEEP AND NOT WAKE UP?: NO
6. HAVE YOU EVER DONE ANYTHING, STARTED TO DO ANYTHING, OR PREPARED TO DO ANYTHING TO END YOUR LIFE?: NO

## 2024-07-17 NOTE — PROGRESS NOTES
"Subjective   Patient ID: Michael Babb is a 69 y.o. male who presents for Follow-up (Pt here for follow up, allergy meds didn't help, burps and has gas a lot, acid reflux.).    Patient is following up for annual physical exam, lab results review and management of multiple chronic diseases.  His lab results are unremarkable.  He presents with complaint of burping, heartburn and gassy.  He also complains of his allergies acting up.         Review of Systems   HENT:  Positive for congestion, postnasal drip, rhinorrhea and sneezing.    Eyes:  Positive for itching.   All other systems reviewed and are negative.      Objective   /86 (BP Location: Left arm, Patient Position: Sitting)   Pulse 79   Ht 1.753 m (5' 9\")   Wt 100 kg (221 lb)   SpO2 95%   BMI 32.64 kg/m²     Physical Exam  Vitals reviewed.   Constitutional:       Appearance: Normal appearance. He is obese.   HENT:      Head: Normocephalic and atraumatic.      Right Ear: External ear normal.      Left Ear: External ear normal.      Nose: Nose normal.      Mouth/Throat:      Mouth: Mucous membranes are moist.   Eyes:      Extraocular Movements: Extraocular movements intact.      Conjunctiva/sclera: Conjunctivae normal.      Pupils: Pupils are equal, round, and reactive to light.   Cardiovascular:      Rate and Rhythm: Normal rate and regular rhythm.      Pulses: Normal pulses.      Heart sounds: Normal heart sounds.   Pulmonary:      Effort: Pulmonary effort is normal.      Breath sounds: Normal breath sounds.   Abdominal:      General: Bowel sounds are normal.      Palpations: Abdomen is soft.   Musculoskeletal:      Cervical back: Neck supple.   Skin:     General: Skin is warm and dry.   Neurological:      General: No focal deficit present.      Mental Status: He is alert and oriented to person, place, and time.   Psychiatric:         Mood and Affect: Mood normal.         Behavior: Behavior normal.         Thought Content: Thought content normal.    "      Judgment: Judgment normal.     OARRS:  No data recorded  I have personally reviewed the OARRS report for Michael Babb. I have considered the risks of abuse, dependence, addiction and diversion    Is the patient prescribed a combination of a benzodiazepine and opioid?  No    Last Urine Drug Screen / ordered today: Yes  No results found for this or any previous visit (from the past 8760 hour(s)).  Results are as expected.     Clinical rationale for not completing a Urine Drug Screen: Oral specimen completed with normal results       Controlled Substance Agreement:  Date of the Last Agreement: 04/11/2024  Reviewed Controlled Substance Agreement including but not limited to the benefits, risks, and alternatives to treatment with a Controlled Substance medication(s).    Testosterone:  What is the patient's goal of therapy? Improve fatigue and ED  Is this being achieved with current treatment? Yes    I attest the patient does not have: Prostate Cancer    Last Testosterone check:  Testosterone, Free   Date Value Ref Range Status   07/08/2024 129.9 35.0 - 155.0 pg/mL Final     Comment:        This test was developed and its analytical performance  characteristics have been determined by [employer] [place], [place]. It has  not been cleared or approved by the U.S. Food and Drug  Administration. This assay has been validated pursuant  to the CLIA regulations and is used for clinical  purposes.        Testosterone   Date Value Ref Range Status   02/20/2023 452 240 - 1,000 ng/dL Final     Comment:      Nandrolone decanoate, 11 Beta-hydroxytestosterone, androstenedione,   testosterone propionate and 11-keto-testosterone strongly cross    react with this test method.    Biotin interference may cause falsely elevated results. Patients   taking a Biotin dose of up to 5 mg/day should refrain from taking   Biotin for 24 hours before sample collection. Providers may contact   their local laboratory for  "further information.       Testosterone, Total, LC-MS/MS   Date Value Ref Range Status   07/08/2024 656 250 - 1100 ng/dL Final     Comment:        Men with clinically significant hypogonadal  symptoms and testosterone values repeatedly in  the range of the 200-300 ng/dL or less, may  benefit from testosterone treatment after  adequate risk and benefits counseling.            For additional information, please refer to  http://education.E[employer]/faq/  [email or web address]  (This link is being provided for informational/  educational purposes only.)     This test was developed and its analytical performance  characteristics have been determined by [employer] [place], [place]. It has  not been cleared or approved by the U.S. Food and Drug  Administration. This assay has been validated pursuant  to the CLIA regulations and is used for clinical  purposes.          Last CBC:    No results found for: \"CBCDIF\", \"BMCBC\", \"PR1\"    Last PSA:   Prostate Specific AG   Date Value Ref Range Status   07/08/2024 0.58 <=4.00 ng/mL Final       Activities of Daily Living:   Is your overall impression that this patient is benefiting (symptom reduction outweighs side effects) from testosterone therapy? Yes     1. Physical Functioning: Same  2. Family Relationship: Same  3. Social Relationship: Same  4. Mood: Same  5. Sleep Patterns: Same  6. Overall Function: Same      Assessment/Plan   Problem List Items Addressed This Visit       Testicular hypergonadotropic hypogonadism          "

## 2024-07-17 NOTE — PATIENT INSTRUCTIONS
I have prescribed Pantoprazole and Gas-x for burping. I have prescribed Zrytec in the morning and Singular 10mg at bedtime for allergies. Your lab results are unremarkable.  Continue taking all current medications as prescribed and follow-up in 3 months.

## 2024-07-24 ENCOUNTER — TELEPHONE (OUTPATIENT)
Dept: PRIMARY CARE | Facility: CLINIC | Age: 69
End: 2024-07-24
Payer: COMMERCIAL

## 2024-07-24 DIAGNOSIS — J30.9 ALLERGIC RHINITIS, UNSPECIFIED SEASONALITY, UNSPECIFIED TRIGGER: Primary | ICD-10-CM

## 2024-07-24 RX ORDER — AZELASTINE 1 MG/ML
1 SPRAY, METERED NASAL 2 TIMES DAILY
Qty: 30 ML | Refills: 12 | Status: SHIPPED | OUTPATIENT
Start: 2024-07-24 | End: 2025-07-24

## 2024-07-24 NOTE — TELEPHONE ENCOUNTER
Patient called and said that the allergy medication is not working  Cetirizine and Montelukast   Has been taking it 1x W   Asking for another suggestion

## 2024-07-31 ENCOUNTER — CLINICAL SUPPORT (OUTPATIENT)
Dept: SLEEP MEDICINE | Facility: CLINIC | Age: 69
End: 2024-07-31
Payer: COMMERCIAL

## 2024-07-31 DIAGNOSIS — G47.33 OSA (OBSTRUCTIVE SLEEP APNEA): ICD-10-CM

## 2024-07-31 NOTE — PROGRESS NOTES
Type of Study: HOME SLEEP STUDY - NOMAD     The patient received equipment and instructions for use of the [employer] Nomad HSAT device. The patient was instructed how to apply the effort belts, cannula, thermistor. It was also explained how the Nomad and oximeter components work.  The patient was asked to record their sleep for an 8-hour period.     The patient was informed of their responsibility for the device and acknowledged this by signing the HSAT device contract. The patient was asked to return the device on 08/01/2024 by 10 AM to the Respiratory Care Department at [facility].     The patient was instructed to call 911 as usual for any medical- emergencies while at home.  The patient was also given a phone number for troubleshooting when using the device in case there were additional questions.

## 2024-08-19 ENCOUNTER — TELEPHONE (OUTPATIENT)
Dept: PRIMARY CARE | Facility: CLINIC | Age: 69
End: 2024-08-19
Payer: COMMERCIAL

## 2024-08-19 DIAGNOSIS — N52.9 ERECTILE DYSFUNCTION, UNSPECIFIED ERECTILE DYSFUNCTION TYPE: ICD-10-CM

## 2024-08-19 RX ORDER — TADALAFIL 20 MG/1
20 TABLET ORAL AS NEEDED
Qty: 30 TABLET | Refills: 1 | Status: SHIPPED | OUTPATIENT
Start: 2024-08-19

## 2024-09-04 ENCOUNTER — TELEPHONE (OUTPATIENT)
Dept: PRIMARY CARE | Facility: CLINIC | Age: 69
End: 2024-09-04
Payer: COMMERCIAL

## 2024-09-04 NOTE — TELEPHONE ENCOUNTER
Testosterone PA denied due to not send lab results and historic reasoning behind need CNP notified asking for an appeal

## 2024-09-05 NOTE — TELEPHONE ENCOUNTER
Pa Denial to be appealled per CNP letter and additional paperwork printed waiting for signature from CNP

## 2024-09-06 ENCOUNTER — APPOINTMENT (OUTPATIENT)
Dept: ALLERGY | Facility: CLINIC | Age: 69
End: 2024-09-06
Payer: COMMERCIAL

## 2024-09-06 DIAGNOSIS — J30.1 ALLERGIC RHINITIS DUE TO POLLEN, UNSPECIFIED SEASONALITY: Primary | ICD-10-CM

## 2024-09-06 DIAGNOSIS — J30.9 ALLERGIC RHINITIS, UNSPECIFIED SEASONALITY, UNSPECIFIED TRIGGER: ICD-10-CM

## 2024-09-06 DIAGNOSIS — H10.45 CHRONIC ALLERGIC CONJUNCTIVITIS: ICD-10-CM

## 2024-09-06 PROCEDURE — 95004 PERQ TESTS W/ALRGNC XTRCS: CPT | Performed by: ALLERGY & IMMUNOLOGY

## 2024-09-06 PROCEDURE — 99204 OFFICE O/P NEW MOD 45 MIN: CPT | Performed by: ALLERGY & IMMUNOLOGY

## 2024-09-06 RX ORDER — FLUTICASONE PROPIONATE 50 MCG
2 SPRAY, SUSPENSION (ML) NASAL DAILY
Qty: 16 G | Refills: 11 | Status: SHIPPED | OUTPATIENT
Start: 2024-09-06 | End: 2025-09-06

## 2024-09-06 NOTE — PROGRESS NOTES
Subjective   Patient ID:   [id number]   Michael Babb is a 69 y.o. male who presents for Allergic Rhinitis and Allergy Testing.    Chief Complaint   Patient presents with    Allergic Rhinitis    Allergy Testing          HPI  This patient is here to evaluate for:  Allergic rhinitis and conjunctivitis     For 3 years, he has been having allergy issues.   Getting worse this year.     Runny nose, sneezing, cough, throat clearing.     Triggers: cottonwood in the air.   Better in the winter.     Sinus infections - denied.     No sleep disturbance and no urgent care visits.     Medications that have not helped include: montelukast, cetirizine, azelastine.     Sh: golf player      Review of Systems   All other systems reviewed and are negative.          Objective     There were no vitals taken for this visit.     Physical Exam  Constitutional:       General: He is not in acute distress.     Appearance: Normal appearance. He is not ill-appearing.   HENT:      Head: Normocephalic and atraumatic.      Right Ear: Tympanic membrane, ear canal and external ear normal.      Left Ear: Tympanic membrane, ear canal and external ear normal.      Nose: Nose normal. No congestion or rhinorrhea.      Mouth/Throat:      Mouth: Mucous membranes are moist.      Pharynx: Oropharynx is clear. No oropharyngeal exudate or posterior oropharyngeal erythema.   Eyes:      General:         Right eye: No discharge.         Left eye: No discharge.      Conjunctiva/sclera: Conjunctivae normal.   Cardiovascular:      Rate and Rhythm: Normal rate and regular rhythm.      Heart sounds: Normal heart sounds. No murmur heard.     No friction rub. No gallop.   Pulmonary:      Effort: Pulmonary effort is normal. No respiratory distress.      Breath sounds: Normal breath sounds. No stridor. No wheezing, rhonchi or rales.   Chest:      Chest wall: No tenderness.   Abdominal:      General: Abdomen is flat.      Palpations: Abdomen is soft.   Musculoskeletal:        "  General: Normal range of motion.      Cervical back: Normal range of motion and neck supple.   Lymphadenopathy:      Cervical: No cervical adenopathy.   Skin:     General: Skin is warm and dry.      Findings: No erythema, lesion or rash.   Neurological:      General: No focal deficit present.      Mental Status: He is alert. Mental status is at baseline.   Psychiatric:         Mood and Affect: Mood normal.         Behavior: Behavior normal.         Thought Content: Thought content normal.         Judgment: Judgment normal.            Current Outpatient Medications   Medication Sig Dispense Refill    azelastine (Astelin) 137 mcg (0.1 %) nasal spray Administer 1 spray into each nostril 2 times a day. Use in each nostril as directed 30 mL 12    cetirizine (ZyrTEC) 10 mg tablet Take 1 tablet (10 mg) by mouth once daily in the morning. 90 tablet 3    ergocalciferol (Vitamin D-2) 1.25 MG (93008 UT) capsule Take 1 capsule (50,000 Units) by mouth 1 (one) time per week.      ipratropium (Atrovent) 42 mcg (0.06 %) nasal spray Administer 2 sprays into each nostril 4 times a day for 10 days. 15 mL 12    losartan (Cozaar) 100 mg tablet Take 1 tablet (100 mg) by mouth once daily. 90 tablet 3    metoprolol succinate XL (Toprol-XL) 100 mg 24 hr tablet Take 1 tablet (100 mg) by mouth 2 times a day. 180 tablet 3    montelukast (Singulair) 10 mg tablet Take 1 tablet (10 mg) by mouth once daily at bedtime. 90 tablet 3    pantoprazole (ProtoNix) 40 mg EC tablet Take 1 tablet (40 mg) by mouth once daily. Do not crush, chew, or split. 90 tablet 3    rosuvastatin (Crestor) 5 mg tablet Take 1 tablet (5 mg) by mouth once daily. 90 tablet 3    simethicone (Mylicon,Gas-X) 125 mg capsule Take 1 capsule (125 mg) by mouth 4 times a day. 360 capsule 0    syringe with needle 3 mL 25 gauge x 1\" syringe To use for testosterone injection 100 each 1    tadalafil (Cialis) 20 mg tablet Take 1 tablet (20 mg) by mouth if needed (1 hour before activity). " 30 tablet 1    testosterone cypionate (Depo-Testosterone) 200 mg/mL injection Inject 1 mL (200 mg) into the muscle every 14 (fourteen) days. 2 mL 2    triamterene-hydrochlorothiazid (Maxzide-25) 37.5-25 mg tablet Take 1 tablet by mouth 2 times a day. 90 tablet 3     No current facility-administered medications for this visit.       Summary of the labs over the past 6 months:    Lab on 07/08/2024   Component Date Value Ref Range Status    Testosterone, Free 07/08/2024 129.9  35.0 - 155.0 pg/mL Final    Testosterone, Total, LC-MS/MS 07/08/2024 656  250 - 1100 ng/dL Final    Prostate Specific AG 07/08/2024 0.58  <=4.00 ng/mL Final    WBC 07/08/2024 7.9  4.4 - 11.3 x10*3/uL Final    nRBC 07/08/2024 0.0  0.0 - 0.0 /100 WBCs Final    RBC 07/08/2024 5.16  4.50 - 5.90 x10*6/uL Final    Hemoglobin 07/08/2024 16.3  13.5 - 17.5 g/dL Final    Hematocrit 07/08/2024 49.3  41.0 - 52.0 % Final    MCV 07/08/2024 96  80 - 100 fL Final    MCH 07/08/2024 31.6  26.0 - 34.0 pg Final    MCHC 07/08/2024 33.1  32.0 - 36.0 g/dL Final    RDW 07/08/2024 13.5  11.5 - 14.5 % Final    Platelets 07/08/2024 216  150 - 450 x10*3/uL Final   Office Visit on 04/29/2024   Component Date Value Ref Range Status    Case Report 04/29/2024    Final                    Value:Dermatopathology                                  Case: [id number]                                   Authorizing Provider:  Ian Price MD      Collected:           04/29/2024 1548              Ordering Location:     [facility]     Received:            04/29/2024 6926              Pathologist:           Fabian Rees MD                                                             Specimen:    SKIN, Left Forearm - Anterior                                                              FINAL DIAGNOSIS 04/29/2024    Final                    Value:SKIN, LEFT FOREARM - ANTERIOR, SHAVE EXCISION:                          LICHENOID KERATOSIS WITH MILD MELANOCYTE HYPERPLASIA, SEE  NOTE.                                                    Note: There is basal layer vacuolization with a superficial lymphocyte                           infiltrate. There is a mild to moderate increase single melanocytes                           without significant pagetoid extension seen on a SOX-10 stain. All control                           slides stain appropriately.                                                    If the lesion persists or recurs a re-excision is recommended.                                                    ** Electronically signed out by Fabian Rees MD **                                04/29/2024    Final                    Value:By the signature on this report, the individual or group listed as making                           the Final Interpretation/Diagnosis certifies that they have reviewed this                           case.     Clinical History 04/29/2024    Final                    Value:Encounter Diagnosis: Seborrheic keratosis, inflamed                                                                               [id number]                          Collection Comments: Differential Diagnosis: ISK                           Check Margins Yes/No?:                            Comments:                            Dermpath Lab: Routine Histopathology (formalin-fixed tissue)                          Finding Region: Left Forearm - Anterior                          Specimen Objective:                               Disclaimer 04/29/2024    Final                    Value:One or more of the reagents used to perform assays on this specimen MAY                           have contained components considered to be analyte specific reagents                           (ASR's).  ASR's have not been cleared or approved by the U.[place]. Food and                           Drug Administration.  These assays were developed and their performance                           characteristics determined by the  Department of Pathology at [facility]. The FDA does not require this test to                           go through premarket FDA review. This test is used for clinical purposes.                           It should not be regarded as investigational or for research. This                           laboratory is certified under the Clinical Laboratory Improvement                           Amendments (CLIA) as qualified to perform high complexity clinical                           laboratory testing.  The assays were performed with appropriate positive                           and negative controls which stained appropriately.    Gross Description 04/29/2024    Final                    Value:A:                           Received in formalin is a 15 x 7 x 1 mm piece of skin.  It is tan and                           brown in color.  It is shave in shape.  It was embedded in toto.  The                           specimen was inked.                                                      The specimen was grossed by Emeli Nguyen.                              Lab on 04/06/2024   Component Date Value Ref Range Status    WBC 04/06/2024 6.8  4.4 - 11.3 x10*3/uL Final    nRBC 04/06/2024 0.0  0.0 - 0.0 /100 WBCs Final    RBC 04/06/2024 4.63  4.50 - 5.90 x10*6/uL Final    Hemoglobin 04/06/2024 14.2  13.5 - 17.5 g/dL Final    Hematocrit 04/06/2024 43.1  41.0 - 52.0 % Final    MCV 04/06/2024 93  80 - 100 fL Final    MCH 04/06/2024 30.7  26.0 - 34.0 pg Final    MCHC 04/06/2024 32.9  32.0 - 36.0 g/dL Final    RDW 04/06/2024 13.6  11.5 - 14.5 % Final    Platelets 04/06/2024 198  150 - 450 x10*3/uL Final    Glucose 04/06/2024 99  74 - 99 mg/dL Final    Sodium 04/06/2024 143  136 - 145 mmol/L Final    Potassium 04/06/2024 4.4  3.5 - 5.3 mmol/L Final    Chloride 04/06/2024 102  98 - 107 mmol/L Final    Bicarbonate 04/06/2024 29  21 - 32 mmol/L Final    Anion Gap 04/06/2024 16  10 -  20 mmol/L Final    Urea Nitrogen 04/06/2024 19  6 - 23 mg/dL Final    Creatinine 04/06/2024 1.11  0.50 - 1.30 mg/dL Final    eGFR 04/06/2024 73  >60 mL/min/1.73m*2 Final    Calcium 04/06/2024 9.8  8.6 - 10.6 mg/dL Final    Albumin 04/06/2024 4.6  3.4 - 5.0 g/dL Final    Alkaline Phosphatase 04/06/2024 46  33 - 136 U/L Final    Total Protein 04/06/2024 7.3  6.4 - 8.2 g/dL Final    AST 04/06/2024 22  9 - 39 U/L Final    Bilirubin, Total 04/06/2024 0.7  0.0 - 1.2 mg/dL Final    ALT 04/06/2024 23  10 - 52 U/L Final    Cholesterol 04/06/2024 168  0 - 199 mg/dL Final    HDL-Cholesterol 04/06/2024 43.4  mg/dL Final    Cholesterol/HDL Ratio 04/06/2024 3.9   Final    LDL Calculated 04/06/2024 72  <=99 mg/dL Final    VLDL 04/06/2024 53 (H)  0 - 40 mg/dL Final    Triglycerides 04/06/2024 265 (H)  0 - 149 mg/dL Final    Non HDL Cholesterol 04/06/2024 125  0 - 149 mg/dL Final    Prostate Specific AG 04/06/2024 0.26  <=4.00 ng/mL Final   Office Visit on 04/05/2024   Component Date Value Ref Range Status    Case Report 04/05/2024    Final                    Value:Dermatopathology                                  Case: [id number]                                   Authorizing Provider:  Ian Price MD      Collected:           04/05/2024 0931              Ordering Location:     [facility]     Received:            04/05/2024 1337              Pathologist:           Fabian Rees MD                                                             Specimen:    SKIN, Left Flank                                                                           FINAL DIAGNOSIS 04/05/2024    Final                    Value:SKIN, LEFT FLANK, INCISIONAL BIOPSY:                          VERRUCOUS KERATOSIS, PRESENT ON THE DEEP AND PERIPHERAL MARGIN.                                                    ** Electronically signed out by Fabian Rees MD **       04/05/2024    Final                    Value:By the signature on this report, the  individual or group listed as making                           the Final Interpretation/Diagnosis certifies that they have reviewed this                           case.     Clinical History 04/05/2024    Final                    Value:Encounter Diagnosis: Inflamed seborrheic keratosis                                                                               [id number]                          Collection Comments: Differential Diagnosis: ISK                           Check Margins Yes/No?:                            Comments:                            Dermpath Lab: Routine Histopathology (formalin-fixed tissue)                          Finding Region: Left Flank                          Specimen Objective:                               Microscopic Description 04/05/2024    Final                    Value:Microscopic examination reveals papillomatosis of benign appearing                           keratinocytes with coarse keratohyalin granules.                                                        Gross Description 04/05/2024    Final                    Value:A:                           .Received in formalin is a 6 x 4 x 2 mm piece of skin.  It is tan in                           color.  It is shave in shape.  It was embedded in toto.  The specimen was                           inked.                                                      The specimen was grossed by Tayla Hinton.      Scratch skin tests were positive to: GRASS, and low to tree pollen  Possibly to weed pollens.      Assessment/Plan   Diagnoses and all orders for this visit:  Allergic rhinitis due to pollen, unspecified seasonality  -     fluticasone (Flonase) 50 mcg/actuation nasal spray; Administer 2 sprays into each nostril once daily. Shake gently. Before first use, prime pump. After use, clean tip and replace cap.  Allergic rhinitis, unspecified seasonality, unspecified trigger  -     Referral to Allergy  -     fluticasone (Flonase)  50 mcg/actuation nasal spray; Administer 2 sprays into each nostril once daily. Shake gently. Before first use, prime pump. After use, clean tip and replace cap.  Chronic allergic conjunctivitis  -     fluticasone (Flonase) 50 mcg/actuation nasal spray; Administer 2 sprays into each nostril once daily. Shake gently. Before first use, prime pump. After use, clean tip and replace cap.    Use the medicine from April to September      Toño Haley MD

## 2024-09-18 DIAGNOSIS — E29.1 TESTICULAR HYPERGONADOTROPIC HYPOGONADISM: ICD-10-CM

## 2024-09-18 RX ORDER — TESTOSTERONE CYPIONATE 200 MG/ML
200 INJECTION, SOLUTION INTRAMUSCULAR
Qty: 2 ML | Refills: 2 | Status: SHIPPED | OUTPATIENT
Start: 2024-09-18 | End: 2024-12-17

## 2024-09-18 NOTE — TELEPHONE ENCOUNTER
Rx Refill Request Telephone Encounter    Name:  Michael Clemensdoo  :  [id number]  Medication Name:  Testosterone cypionate  200 mg      Inject 1 ml into the muscle every 14 days    Specific Pharmacy location:  [facility]  Date of last appointment:  2024

## 2024-09-28 NOTE — PROGRESS NOTES
[facility]  POR [address]  [facility]  [address]  [facility] [id number]     [facility]  Follow Up Visit Note      Subjective   Patient ID: Michael Babb is a 68 y.o. male with past medical history significant for Obesity, Hypertension, Anxiety, and Obstructive sleep apnea.    10/3/24: UPDATED: The patient returned to the clinic to review his sleep study to treat his sleep apnea. The desensitization strategy, 30-day free mask return policy, and insurance requirements are all discussed with the patient. The patient verbalized understanding it. He has been using his pap for 24-25 years faithfully. His over 4 hours pap compliance rate was  100   %, and His ESS  is  2 today. I provided him with a sample of Dream Wear a nasal mask to try in the clinic; he reports being comfortable with it. We also discussed the mouth-sealing strategy to prevent air leaking, and he would like to purchase ZZZ tape.      7/16/24:  The patient returned to the clinic to review his pap compliance after adjusting the pressure setting. He bought his old machine, which he purchased himself many years ago, and would like to get a new one from the insurance company. His old pap compliance for over four hours usage rate was 100 %, and His ESS  is  0 today. He understood and agreed to do a home sleep study and confirm his OBSTRUCTIVE SLEEP APNEA diagnosis to get a new pap continuously to treat his SHERMAN.        5/9/24:  He brought his Yoshi pap to the clinic. The previous setting was 15 CWP; I adjusted it to 18 CWP in the clinic because it needed more pressure and woke him up at night. He felt comfortable. I also provided a small sample mask of Yoshi's dreamwear under the nose small size in the clinic. His Ess is one today.        3/19/2024: The Patient is alone today for a comprehensive sleep medicine evaluation due to sleep apnea on  pap. Today, he came to the office to ask for another pap because he has faithfully used pap and needs a new machine. He has a provider at [facility]. However, the provider moves out and needs to find another provider to establish care. He recently felt an extremely dry mouth when he woke up and required more pressure to wake him up at night. ESS: 0 JAVIER: 10, and neck circumference is 16 inches today.     HPI  The Patient had been having these symptoms for the past 25 years. The Patient was diagnosed with SHERMAN in 2019 by [facility] and has started on CPAP since then. Currently on auto-CPAP 18 cm H2O with EPR/Flex 1 and nasal pillow mask through [facility]. The Patient has been using the machine every night.      SLEEP STUDY HISTORY: (personally reviewed raw data such as interpretation report, data sheet, hypnogram, and titration table if available and applicable)  4/16/2019: pap titration: AHI : 82.7, Michael: 77%, consider CPAP 15cm H2O   7/31/2024   The REI3% was 72.5/hr. The REI4% was 62.5/hr. JEFF was 0.1/hr. The supine REI3% was 74.2; non-supine REI3% was 67.6. The supine REI4% was 64.5; non-supine REI4% as 56.8. During sleep, based on REI3%, the breathing pattern demonstrated severe sleep disordered breathing, characterized predominantly by obstructive events. The mean oxygen saturation was 92% during the monitoring time. The oxygen saturation was <88% for 53 minutes. The SpO2 michael was 81%.      SLEEP-WAKE SCHEDULE  Bedtime: 10 PM on weekdays, 11 PM on weekends  Subjective sleep latency: 10 minutes  Problems falling asleep: no  Number of awakenings: 2-3 times per night spontaneously for urination  Falls back asleep in 10 minutes  Problems staying asleep: no  Final wake time: 5:30 AM on weekdays, 7 AM on weekends  Out of bed time: 5:40 AM on weekdays, 7 AM on weekends  Shift work: no  Naps: no  Average sleep duration (excluding naps): 7 hours     SLEEP ENVIRONMENT  Sleep location: bed  Sleep status: sleeps with girl friend  Room is  dark:  Yes  Room is quiet: Yes  Room is cool: Yes  Bed comfort: good     SLEEP HABITS:   Activities before bedtime: watch TV  Activities in bed: no   Preferred sleep position: back     SLEEP ROS:  Night symptoms: POSITIVE for snoring before PAP but not now and mouth breathing  Morning symptoms: POSITIVE for unrefreshing sleep and morning dry mouth  Daytime symptoms POSITIVE for excessive daytime sleepiness before PAP but not now  Hypersomnia / narcolepsy symptoms: Patient denies symptoms of a hypersomnolence disorder such as sleep paralysis, sleep-related hallucinations, recurrent sleep attacks, automatic behaviors, and cataplexy.   RLS symptoms: Patient denies RLS symptoms.  Movements in sleep: Patient denies problematic movements in sleep.  Parasomnia symptoms: Patient denies symptoms of parasomnia.     WEIGHT: stable     REVIEW OF SYSTEMS: All other systems have been reviewed and are negative.     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 No significant changes unless documented in the pertinent chart section. Updates made when necessary.          Objective   Vitals:    10/03/24 1304   BP: 132/73   Pulse: 99   Resp: 16   Temp: 36.7 °C (98.1 °F)   SpO2: 95%         Physical Exam  Constitutional:alert and oriented to time, place, and person  Lungs: Clear to auscultation bilateral, no rales  Heart: Regular rate and rhythm, no murmurs        PAP Adherence:  DURABLE MEDICAL EQUIPMENT COMPANY: [employer]  Machine: THERAPY: [employer] DREAMSTATION PRESSURE SETTINGS: 20 cm H2O  Mask: nasal pillow mask  Issues with therapy: ISSUES WITH THERAPY:needs more  pressure  Benefits with PAP: PERCEIVED BENEFITS OF PAP: refreshing sleep decreased episodes of nocturia better sleep quality     A PAP adherence download was obtained and data was reviewed personally today in clinic. (see scanned document in EPIC)           Assessment/Plan   Michael Babb is a 69 y.o. male who is seen to evaluate for severe obstructive sleep apnea. Risk factors of sleep apnea as well as cardiometabolic and neurocognitive sequelae associated with untreated sleep apnea were also discussed. Lastly, patient was advised to avoid driving vehicle or operating heavy machinery when sleepy.      Michael Babb with the following problems:     # OBSTRUCTIVE SLEEP APNEA:  -Order a new pap and  start 20 CWP CPAP via [facility]. (Expedite it)  -I provide him a sample of dream wear nasal mask to try in the clinic, he  report being comfortable with it.   -We also discuss the mouth sealing strategy to prevent air leaking, and he would to purchase ZZZ tape.   -Great compliance, continue 20 cmH20 via [facility]. I also order annual supplies via [facility].  -Sleep apnea and PAP therapy education were provided at length in the clinic today. Michael Clemensdoo verbalized understanding.  -Emphasized diet, exercise, and weight loss in the clinic, as were non-supine sleep, avoiding alcohol in the late evening, and driving or operating heavy machinery when sleepy.  -Michael WALLY Justenoverbalizes understanding of the above instructions and risks.     # HYPERTENSION:  -Blood pressure was 132/73 today.  -Denies headache, palpitation, and syncope in the clinic.  -Follows with PCP/ Cardiology     # OBESITY :  -with a BMI of 32.93. Michael WALLY Skinny most recent Bicarbonate was 29            Bicarbonate   Date Value Ref Range Status   10/04/2023 29 21 - 32 mmol/L Final   -Encourage to have regular exercise to manage weight well.     # XEROSTOMIA:  -Instruct Michael Duque purchase the Biotene gel to ease the dry mouth symptom,        RTC 1  month after receiving CPAP         All of patient's questions were answered. He verbalizes understanding and agreement with my assessment and plan.

## 2024-10-03 ENCOUNTER — OFFICE VISIT (OUTPATIENT)
Dept: SLEEP MEDICINE | Facility: CLINIC | Age: 69
End: 2024-10-03
Payer: COMMERCIAL

## 2024-10-03 VITALS
DIASTOLIC BLOOD PRESSURE: 73 MMHG | RESPIRATION RATE: 16 BRPM | SYSTOLIC BLOOD PRESSURE: 132 MMHG | TEMPERATURE: 98.1 F | OXYGEN SATURATION: 95 % | BODY MASS INDEX: 33.03 KG/M2 | HEART RATE: 99 BPM | WEIGHT: 223 LBS | HEIGHT: 69 IN

## 2024-10-03 DIAGNOSIS — J30.9 ALLERGIC RHINITIS, UNSPECIFIED SEASONALITY, UNSPECIFIED TRIGGER: ICD-10-CM

## 2024-10-03 DIAGNOSIS — R00.0 TACHYCARDIA: ICD-10-CM

## 2024-10-03 DIAGNOSIS — K11.7 XEROSTOMIA: ICD-10-CM

## 2024-10-03 DIAGNOSIS — G47.33 OSA (OBSTRUCTIVE SLEEP APNEA): Primary | ICD-10-CM

## 2024-10-03 DIAGNOSIS — I10 BENIGN ESSENTIAL HYPERTENSION: ICD-10-CM

## 2024-10-03 PROBLEM — Z00.00 ANNUAL PHYSICAL EXAM: Status: RESOLVED | Noted: 2023-10-10 | Resolved: 2024-10-03

## 2024-10-03 PROCEDURE — 1160F RVW MEDS BY RX/DR IN RCRD: CPT

## 2024-10-03 PROCEDURE — 99213 OFFICE O/P EST LOW 20 MIN: CPT

## 2024-10-03 PROCEDURE — 1036F TOBACCO NON-USER: CPT

## 2024-10-03 PROCEDURE — 1159F MED LIST DOCD IN RCRD: CPT

## 2024-10-03 PROCEDURE — 3078F DIAST BP <80 MM HG: CPT

## 2024-10-03 PROCEDURE — 1123F ACP DISCUSS/DSCN MKR DOCD: CPT

## 2024-10-03 PROCEDURE — 3075F SYST BP GE 130 - 139MM HG: CPT

## 2024-10-03 PROCEDURE — 3008F BODY MASS INDEX DOCD: CPT

## 2024-10-03 ASSESSMENT — SLEEP AND FATIGUE QUESTIONNAIRES
HOW LIKELY ARE YOU TO NOD OFF OR FALL ASLEEP WHILE SITTING AND TALKING TO SOMEONE: WOULD NEVER DOZE
HOW LIKELY ARE YOU TO NOD OFF OR FALL ASLEEP WHEN YOU ARE A PASSENGER IN A CAR FOR AN HOUR WITHOUT A BREAK: WOULD NEVER DOZE
HOW LIKELY ARE YOU TO NOD OFF OR FALL ASLEEP WHILE SITTING AND READING: WOULD NEVER DOZE
HOW LIKELY ARE YOU TO NOD OFF OR FALL ASLEEP IN A CAR, WHILE STOPPED FOR A FEW MINUTES IN TRAFFIC: WOULD NEVER DOZE
SITING INACTIVE IN A PUBLIC PLACE LIKE A CLASS ROOM OR A MOVIE THEATER: WOULD NEVER DOZE
ESS-CHAD TOTAL SCORE: 2
HOW LIKELY ARE YOU TO NOD OFF OR FALL ASLEEP WHILE WATCHING TV: WOULD NEVER DOZE
HOW LIKELY ARE YOU TO NOD OFF OR FALL ASLEEP WHILE LYING DOWN TO REST IN THE AFTERNOON WHEN CIRCUMSTANCES PERMIT: MODERATE CHANCE OF DOZING
HOW LIKELY ARE YOU TO NOD OFF OR FALL ASLEEP WHILE SITTING QUIETLY AFTER LUNCH WITHOUT ALCOHOL: WOULD NEVER DOZE

## 2024-10-03 NOTE — PATIENT INSTRUCTIONS
[facility] Sleep Medicine  POR [address]  [facility]  [address]  [facility] [address]       Thank you for coming to the Sleep Medicine Clinic today! Your sleep medicine provider today was: JOSS Bustamante Below is a summary of your treatment plan, patient education, other important information, and our contact numbers.    Dear Mr. Michael Babb       Your Sleep Provider Today: JOSS Bustamante  Your Primary Care Physician: JOSS Nava   Your Referring Provider: Fadia Galvez APRN-CNP    Diagnosis: OBSTRUCTIVE SLEEP APNEA       Thank you for visiting  Sleep Medicine Clinic !     1. According to your symptom and sleep study report. I will order the new PAP device for you to control your sleep apnea, feel free to contact your DME.   If [employer] is your DME, you can reach them at [phone].   2. Please do not drive when you are sleepy and continue practicing the sleep hygiene as discussed in clinic.    3. FOR QUESTIONS AND CONCERNS:   a) : In case of problems with machine or mask interface, please contact your DME company first. DME is the company who provides you the machine and/or PAP supplies / accessories. If [employer] is your DME, you can reach them at [phone].   b):  Please call my office with issues or questions: [phone] ([place]); [phone] ([facility]); [phone] ([place])    If you have a CPAP or BiPAP machine at home, please bring the unit and all accessories including the power cord to your appointments unless I tell you otherwise. Please have knowledge of the DME company you worked with to receive your PAP device. If you have copies of any previous sleep testing completed outside of , please bring with you to clinic as well. This information will make our visits more productive.     If you are new to CPAP or BiPAP, please note the minimum usage insurance  requires to continuing coverage for the equipment as noted by your DME company. Please discuss equipment issues (PAP unit, mask fit, humidification, etc.) with your DME company first.       In the event that you are running more than 10 minutes late to your appointment, I will kindly ask you to reschedule. Thanks.      TREATMENT PLAN     Follow-up Appointment:   Follow-up in 31 days after getting new machine.    PATIENT EDUCATION     OBSTRUCTIVE SLEEP APNEA (SHERMAN) is a sleep disorder where your upper airway muscles relax during sleep and the airway intermittently and repetitively narrows and collapses leading to partially blocked airway (hypopnea) or completely blocked airway (apnea) which, in turn, can disrupt breathing in sleep, lower oxygen levels while you sleep and cause night time wakings. Because both apnea and hypopnea may cause higher carbon dioxide or low oxygen levels, untreated SHERMAN can lead to heart arrhythmia, elevation of blood pressure, and make it harder for the body to consolidate memory and facilitate metabolism (leading to higher blood sugars at night). Frequent partial arousals occur during sleep resulting in sleep deprivation and daytime sleepiness. SHERMAN is associated with an increased risk of cardiovascular disease, stroke, hypertension, and insulin resistance. Moreover, untreated SHERMAN with excessive daytime sleepiness can increase the risk of motor vehicular accidents.    Below are conservative strategies for SHERMAN regardless of SHERMAN severity are:   Positional therapy - Avoid sleeping on your back.   Healthy diet and regular exercise to optimize weight is highly encouraged.   Avoid alcohol late in the evening and sedative-hypnotics as these substances can make sleep apnea worse.   Improve breathing through the nose with intranasal steroid spray, saline rinse, or antihistamines    Safety: Avoid driving vehicle and operating heavy equipment while sleepy. Drowsy driving may lead to life-threatening  motor vehicle accidents. A person driving while sleepy is 5 times more likely to have an accident. If you feel sleepy, pull over and take a short power nap (sleep for less than 30 minutes). Otherwise, ask somebody to drive you.    Treatment options for sleep apnea include weight management, positional therapy, Positive Airway Therapy (PAP) therapy, oral appliance therapy, hypoglossal nerve stimulator (Inspire) and select airway surgeries.    Starting Positive Airway Pressure (PAP): You were ordered a device to wear when you sleep called PAP (Positive Airway Pressure) to treat your sleep apnea. The order will be submitted to a durable medical equipment (DME) company who will arrange setting you up with the device. They will provide all the necessary equipment and discuss use and maintenance of the device with you as well as mask fitting and process of replacing / renewing PAP supplies or accessories. Once you get the machine, please start using it immediately. You may not be successful right away and that is okay. Jodee be certain that you keep trying nightly and reach out to DME if you are struggling or having issues with machine usage.     *Please follow-up with me in 1-2 months of starting CPAP to see how well it is working for you and to do some troubleshooting if needed. Also, please bring all PAP equipment with you to follow up appointments unless told otherwise.     Important things to keep in mind as you start PAP:  Insurance will monitor your usage during the first 90 days. You should use your PAP - all night, every night, and including all naps (especially if naps are more than 30 minutes) for your health. The bare minimum is to use your PAP device while sleeping for at least 4 hours per day at least 5-6 days per week.. Otherwise, your PAP device will be reclaimed by your DME company at 90 days.  There are many masks to choose from to wear with your PAP machine. If you are not comfortable with the first  mask issued to you, call your DME company and ask for another option to try. You typically have a 30-day mask guarantee from the day you received your machine.   Discuss with your provider if you are having issues breathing with the machine or if the temperature or humidity feel uncomfortable.  Expect to have an adjustment period when you start your device. It helps to continuing wearing the machine every day for a period of time until you get more used to it. You can practice with wearing the mask alone if you need, then add in the PAP air pressure a few days later.   Reach out for help if you are struggling! The sleep medicine department can be reached at [phone]  We encourage you to download data monitoring apps to your phone. For [employer]Your[employer] 10/11 - Intrapace erika. For HubHub - DreamMapper. Both apps are available in the Erika store for free and are a great tool to monitor your progress with your PAP device night to night.    Tips for success with PAP machine usage:  Comfortable and well-fitting mask  Appropriate pressure on the machine  Using humidification  Support from bed partner and clinical team      Maintaining your CPAP/BPAP device:    The humidification chamber (aka water tank or water chamber) needs to be filled with distilled water to prevent buildup of white deposits in the future. If you cannot find distilled water, you can use tap water but expect to have white deposits buildup seen after prolonged use with tap water. If you start seeing white deposits on the water chamber, you can clean it by filling it with equal parts of distilled white vinegar and water. Let the vinegar-water mixture sit for 2 hours, and then rinse it with running tap water. Clean with soap and water then let it dry.     You should try to keep your machine clean in order to work well. Here are some tips to clean PAP supplies / accessories:    Clean the humidification chamber (aka water tank) as well as  your mask and tubing at least once a week with soap and water.   Alternatively, you can fill a sink or basin with warm water and add a little mild detergent, like Ivory dish soap. Gently wipe your supplies with the soapy water to free all the oils and dirt that may have collected. Once that's done, rinse these items with clean water until the soap is gone and let them air dry. You can hang your tubing over the curtain tim in your bathroom so that it dries.  The mask insert (part of the mask that has contact with your skin) needs to be cleaned with soap and water daily. Another option is to wipe them down with CPAP wipes or baby wipes.    You should replace your mask and tubing frequently in order to prevent bacteria buildup, machine damage, and mask seal issues. The older the mask and hose, the high likelihood that there is bacteria buildup in it especially if they are not cleaned regularly. Dirty filters damage machines because build-up of dust and contaminants can cause machine to over-heat, and in time, damage the motor of machine. Cushions lose their seal over time as most masks are made of plastic and silicone while headgear is made of neoprene. These materials will break down with age and frequent use. Here is the recommended replacement schedule for PAP supplies / accessories:    Twice a month- disposable filters and cushions for nasal mask or nasal pillows.  Once a month- cushion for full face mask  Every 3 months- mask with headgear and PAP tubing (standard or heated hose)  Every 6 months- reusable filter, water chamber, and chin strap     Other useful information:    Some people do not put water in the tank while other people prefers to put water in the tank to prevent mouth dryness. Try to experiment to determine which is more comfortable for you.   In general, new machines have 2 years warranty on parts while health insurance allows you to have a new machine once every 5 years.     Common issues with PAP  machine:    Mask gets dislodged when turning to the side: Consider getting a CPAP pillow or switching to a mask with hose on top.     Dry mouth:  Your machine has built-in humidifier that heats up the air to prevent dry mouth. It can be adjusted to your comfort. You can try that first and increase setting one level one night at a time to check which setting is comfortable and effective in lessening dry mouth. In some patients with heated hose, adjusting tube temperature to make air warmer can improve dry mouth. If dry mouth persists despite adjusting humidity or tube temperature setting, may apply OTC Biotene gel over the gums at bedtime.  If Biotene gel is not effective, consider trying XEROSTOM gel from [employer].  Also, eliminate or reduce dose of medications that can cause dry mouth if possible. Lastly, may try getting a separate room humidifier machine.    Airleaks: Please call DME as they may need to adjust your mask or refit you with a different kind or different size of mask. In addition, you can ask DME for tips on getting a good mask seal and mask fit.     Difficulty tolerating the mask: Contact your DME to try a different kind of mask and/or call office to get a referral to Sleep Psychologist for CPAP desensitization. CPAP desensitization technique is a set of strategies that helps patient cope with claustrophobia and anxiety related to wearing mask. Alternatively, we can do a daytime mini-sleep study called PAP-nap trial wherein you will try on different kinds of mask and the sleep technician will try different pressure settings on CPAP and BPAP machines to see which specific pressure is tolerable and comfortable for you.     Water droplets or moisture within the hose and/or mask: This is called rain-out and it is caused by condensation of too much heated humidity on the cooler walls of the hose. If you have rain-out, turn down humidity settings or get a heated hose. If you already have a heated hose,  "turn up the \"tube temperature\" of the heated hose. Alternatively, if you don't want to get a heated hose or warmer air, may wrap the CPAP hose with stockings to keep it somewhat warm. Also, you need to place the machine on the floor and lower the hose so that water won't travel upward towards your mask.     PAP desensitization techniques: If you have concerns about something being on your face at night, you can start by getting used to it before trying to sleep with it as follows:      Sit in a comfortable chair or bed. Connect the mask and hose to the CPAP/BPAP machine. Hold the mask on your face (without straps on) and turn on the machine. Practice breathing with the mask on while awake sitting and watching television, reading, or performing a sedentary activity during the day for 5-10 minutes and then take it off.  If tolerated, try again and gradually build up to longer periods of time. If not tolerated, try and try again until it is more comfortable as you become more desensitized. If you are able to use it for at least 20-30 minutes, move unto the next step.     Sit in a comfortable chair or bed. Connect the mask and hose to the CPAP/BPAP machine. Strap the mask on your head and turn on the machine. Practice breathing with the mask and headgear on while awake sitting and watching television, reading, or performing a sedentary activity. Start with 5-10 minutes and gradually increasing time until you can wear it comfortably for at least 20-30 minutes, then move to the next step.    Take a shorter daytime nap with machine turned on while you are in a reclined position in bed, sofa, or recliner. Start with 5-10 minute nap and gradually increase up to 30 minutes. It is not important whether you fall asleep or not. The goal is to rest comfortably with PAP machine on.     Reintroduce PAP machine into nighttime sleep. You can begin using it a portion of the night and gradually increase up to entire night.     Proceed " from one step to the next only when you are completely comfortable. If you feel any anxiety or discomfort, return to the previous step, then proceed again when comfortable.    Expect to “work” with your CPAP/BIPAP unit. It is important to try to relax when beginning CPAP/BIPAP therapy. Inhalation and exhalation should occur through the nose only. If you are unable to consistently breathe this way, do not panic or lose hope. There are other types of masks which allow you to breathe through your nose and/or your mouth. Also, in some patients, using intranasal steroid spray (e.g. Flonase or Nasocort or Fluticasone) 1 hour before bedtime and/or before putting on CPAP mask can help tolerate breathing through the mask.    Don't give up after a few attempts--some patients adjust quickly, while some patients need 3-4 weeks (or sometimes even longer) to be accustomed to CPAP therapy.  Contact your sleep medicine specialist if you have a significant change in weight since this may affect your pressure.    You can also go to the following EDUCATION WEBSITES for further information:   American Academy of Sleep Medicine http://sleepeducation.org  National Sleep Foundation: https://sleepfoundation.org  American Sleep Apnea Association: https://www.sleepapnea.org (for patients with sleep apnea)  Narcolepsy Network: https://www.narcolepsynetwork.org (for patients with narcolepsy)  [employer]: https://www.wakeupnarcolepsy.org (for patients with narcolepsy)  Hypersomnia Foundation: https://www.hypersomniafoundation.org (for patients with idiopathic hypersomnia)  RLS foundation: https://www.rls.org (for patients with restless leg syndrome)    IMPORTANT INFORMATION     Call 911 for medical emergencies.  Our offices are generally open from Monday-Friday, 8 am - 5 pm.   There are no supporting services by either the sleep doctors or their staff on weekends and Holidays, or after 5 PM on weekdays.   If you need to get in touch  with me, you may either call my office number or you can use [employer].  If a referral for a test, for CPAP, or for another specialist was made, and you have not heard about scheduling this within a week, please call scheduling at [phone] ([phone]).  If you are unable to make your appointment for clinic or an overnight study, kindly call the office or sleep testing center at least 48 hours in advance to cancel and reschedule.  If you are on CPAP, please bring your device's card and/or the device to each clinic appointment.   In case of problems with PAP machine or mask interface, please contact your DME (Durable Medical Equipment) company first. DME is the company who provides you the machine and/or PAP supplies.       PRESCRIPTIONS     We require 7 days advanced notice for prescription refills. If we do not receive the request in this time, we cannot guarantee that your medication will be refilled in time.    IMPORTANT PHONE NUMBERS     Sleep Medicine Clinic Fax: [phone]  Appointments (for Pediatric Sleep Clinic): [phone] - option 1  Appointments (for Adult Sleep Clinic): [phone] - option 2  Appointments (For Sleep Studies): [phone] - option 3  Behavioral Sleep Medicine: [phone]  Sleep Surgery: [phone]  Nutrition Service: [phone]  Weight management clinics with endocrinology: [phone]  Bariatric Services: [phone] (includes weight loss medications and weight loss surgery)  [facility]: [phone] (offers holistic approaches to weight management)  ENT (Otolaryngology): [phone]  Headache Clinic (Neurology): [phone]  Neurology: [phone]  Psychiatry: [phone]  Pulmonary Function Testing (PFT) Center: [phone]  Pulmonary Medicine: [phone]  [employer] (DME): [phone]      OUR SLEEP TESTING LOCATIONS     Our team will contact you to schedule your sleep study, however, you can contact us as  "follow:  Main Phone Line (scheduling only): [phone]), option 3  Adult and Pediatric Locations   [place] (6 years and older): [facility] - 4th floor ([address] [facility]) After hours line: [phone]  [facility] (Main campus: All ages): [facility], 6th floor. After hours line: [phone]   [place] (5 years and older; younger considered on case-by-case basis): [address]; [address], [address]. Scheduling  After hours line: [phone]   [place] (6 years and older): [address]; [address]; [address]   [place] (6 years and older): [address] [facility], [address]  After hours line: [phone]   [place] (13 years and older) in [place]: [address], 2nd floor  After hours line: [phone]   [place] (13 year and older): [address]  After hours line: [phone]     Adult Only Locations:   [place] (18 years and older): [address] [facility], 2nd floor   [place] (18 years and older): [address] [facility]; 4th floor  After hours line: [phone]   [place] (18 years and older) at [place]: [id number] [place] [address]  After hours line: [phone]     CONTACTING YOUR SLEEP MEDICINE PROVIDER AND SLEEP TEAM      For issues with your machine or mask interface, please call your DME provider first. DME stands for durable medical company. DME is the company who provides you the machine and/or PAP supplies / accessories.   To schedule, cancel, or reschedule SLEEP STUDY APPOINTMENTS, please call the Main Phone Line at [phone] - option 3.   To schedule, cancel, or reschedule CLINIC APPOINTMENTS, you can do it in \"MyChart\", call [phone] to speak with my  (Juliet Pham), or call the Main Phone Line at [phone] - option 2  For CLINICAL QUESTIONS or MEDICATION REFILLS, please call direct line for Adult Sleep Nurses at [phone]. " "  Lastly, you can also send a message directly to your provider through \"My Chart\", which is the email service through your  Records Account: [email or web address]       Here at [facility], we wish you a restful sleep!   "

## 2024-10-03 NOTE — LETTER
October 3, 2024     Patient: Michael Babb   YOB: 1955   Date of Visit: 10/3/2024       To Whom It May Concern:    Michael Babb was seen in my clinic on 10/3/2024 at 1:00 pm. Please excuse Michael for his absence from work on this day to make the appointment.    If you have any questions or concerns, please don't hesitate to call.         Sincerely,         ELIZABETH Bustamante-CNP        CC: No Recipients

## 2024-10-17 ENCOUNTER — APPOINTMENT (OUTPATIENT)
Dept: PRIMARY CARE | Facility: CLINIC | Age: 69
End: 2024-10-17
Payer: COMMERCIAL

## 2024-10-17 VITALS
WEIGHT: 221.6 LBS | SYSTOLIC BLOOD PRESSURE: 131 MMHG | HEIGHT: 69 IN | HEART RATE: 69 BPM | OXYGEN SATURATION: 95 % | BODY MASS INDEX: 32.82 KG/M2 | DIASTOLIC BLOOD PRESSURE: 73 MMHG

## 2024-10-17 DIAGNOSIS — J30.9 ALLERGIC RHINITIS, UNSPECIFIED SEASONALITY, UNSPECIFIED TRIGGER: ICD-10-CM

## 2024-10-17 DIAGNOSIS — E29.1 TESTICULAR HYPERGONADOTROPIC HYPOGONADISM: Primary | ICD-10-CM

## 2024-10-17 PROCEDURE — 99213 OFFICE O/P EST LOW 20 MIN: CPT | Performed by: NURSE PRACTITIONER

## 2024-10-17 PROCEDURE — 1160F RVW MEDS BY RX/DR IN RCRD: CPT | Performed by: NURSE PRACTITIONER

## 2024-10-17 PROCEDURE — 3078F DIAST BP <80 MM HG: CPT | Performed by: NURSE PRACTITIONER

## 2024-10-17 PROCEDURE — 3008F BODY MASS INDEX DOCD: CPT | Performed by: NURSE PRACTITIONER

## 2024-10-17 PROCEDURE — 1159F MED LIST DOCD IN RCRD: CPT | Performed by: NURSE PRACTITIONER

## 2024-10-17 PROCEDURE — 1123F ACP DISCUSS/DSCN MKR DOCD: CPT | Performed by: NURSE PRACTITIONER

## 2024-10-17 PROCEDURE — 1036F TOBACCO NON-USER: CPT | Performed by: NURSE PRACTITIONER

## 2024-10-17 PROCEDURE — 3075F SYST BP GE 130 - 139MM HG: CPT | Performed by: NURSE PRACTITIONER

## 2024-10-17 ASSESSMENT — PATIENT HEALTH QUESTIONNAIRE - PHQ9
2. FEELING DOWN, DEPRESSED OR HOPELESS: NOT AT ALL
1. LITTLE INTEREST OR PLEASURE IN DOING THINGS: NOT AT ALL
SUM OF ALL RESPONSES TO PHQ9 QUESTIONS 1 AND 2: 0

## 2024-10-17 ASSESSMENT — ENCOUNTER SYMPTOMS
LOSS OF SENSATION IN FEET: 0
OCCASIONAL FEELINGS OF UNSTEADINESS: 0
DEPRESSION: 0

## 2024-10-17 NOTE — PROGRESS NOTES
OARRS:  No data recorded  I have personally reviewed the OARRS report for Michael Babb. I have considered the risks of abuse, dependence, addiction and diversion    Is the patient prescribed a combination of a benzodiazepine and opioid?  No    Last Urine Drug Screen / ordered today: Yes  No results found for this or any previous visit (from the past 8760 hours).  N/A    Clinical rationale for not completing a Urine Drug Screen: Deferred       Controlled Substance Agreement:  Date of the Last Agreement: 4/11/24  Reviewed Controlled Substance Agreement including but not limited to the benefits, risks, and alternatives to treatment with a Controlled Substance medication(s).    Testosterone:  What is the patient's goal of therapy? Improve fatigue and ED  Is this being achieved with current treatment? Yes    I attest the patient does not have: Prostate Cancer    Last Testosterone check:  Testosterone, Free   Date Value Ref Range Status   07/08/2024 129.9 35.0 - 155.0 pg/mL Final     Comment:        This test was developed and its analytical performance  characteristics have been determined by [employer] [place], [place]. It has  not been cleared or approved by the U.S. Food and Drug  Administration. This assay has been validated pursuant  to the CLIA regulations and is used for clinical  purposes.        Testosterone   Date Value Ref Range Status   02/20/2023 452 240 - 1,000 ng/dL Final     Comment:      Nandrolone decanoate, 11 Beta-hydroxytestosterone, androstenedione,   testosterone propionate and 11-keto-testosterone strongly cross    react with this test method.    Biotin interference may cause falsely elevated results. Patients   taking a Biotin dose of up to 5 mg/day should refrain from taking   Biotin for 24 hours before sample collection. Providers may contact   their local laboratory for further information.       Testosterone, Total, LC-MS/MS   Date Value Ref Range Status   07/08/2024  "656 250 - 1100 ng/dL Final     Comment:        Men with clinically significant hypogonadal  symptoms and testosterone values repeatedly in  the range of the 200-300 ng/dL or less, may  benefit from testosterone treatment after  adequate risk and benefits counseling.            For additional information, please refer to  http://education.[employer]/faq/  [email or web address]  (This link is being provided for informational/  educational purposes only.)     This test was developed and its analytical performance  characteristics have been determined by [employer] [place], [place]. It has  not been cleared or approved by the U.S. Food and Drug  Administration. This assay has been validated pursuant  to the CLIA regulations and is used for clinical  purposes.          Last CBC:    No results found for: \"CBCDIF\", \"BMCBC\", \"PR1\"    Last PSA:   Prostate Specific AG   Date Value Ref Range Status   07/08/2024 0.58 <=4.00 ng/mL Final       Activities of Daily Living:   Is your overall impression that this patient is benefiting (symptom reduction outweighs side effects) from testosterone therapy? Yes     1. Physical Functioning: Same  2. Family Relationship: Same  3. Social Relationship: Same  4. Mood: Same  5. Sleep Patterns: Same  6. Overall Function: Same    "

## 2024-10-21 ENCOUNTER — TELEPHONE (OUTPATIENT)
Dept: PRIMARY CARE | Facility: CLINIC | Age: 69
End: 2024-10-21
Payer: COMMERCIAL

## 2024-10-21 DIAGNOSIS — K21.9 GASTROESOPHAGEAL REFLUX DISEASE WITHOUT ESOPHAGITIS: ICD-10-CM

## 2024-10-21 RX ORDER — PANTOPRAZOLE SODIUM 40 MG/1
40 TABLET, DELAYED RELEASE ORAL DAILY
Qty: 90 TABLET | Refills: 3 | Status: SHIPPED | OUTPATIENT
Start: 2024-10-21 | End: 2025-10-21

## 2024-10-25 ENCOUNTER — TELEPHONE (OUTPATIENT)
Dept: PRIMARY CARE | Facility: CLINIC | Age: 69
End: 2024-10-25
Payer: COMMERCIAL

## 2024-10-25 NOTE — TELEPHONE ENCOUNTER
Patient needed a PA for Pantoprazole submitted in [facility] and approved from 9/25/2024-10/25/2025, patient notified

## 2024-11-14 DIAGNOSIS — I10 BENIGN ESSENTIAL HYPERTENSION: ICD-10-CM

## 2024-11-14 RX ORDER — LOSARTAN POTASSIUM 100 MG/1
100 TABLET ORAL DAILY
Qty: 90 TABLET | Refills: 3 | Status: SHIPPED | OUTPATIENT
Start: 2024-11-14

## 2024-11-18 DIAGNOSIS — Z96.652 HISTORY OF TOTAL LEFT KNEE REPLACEMENT: ICD-10-CM

## 2024-11-19 ENCOUNTER — APPOINTMENT (OUTPATIENT)
Dept: ORTHOPEDIC SURGERY | Facility: CLINIC | Age: 69
End: 2024-11-19
Payer: MEDICARE

## 2024-11-19 ENCOUNTER — HOSPITAL ENCOUNTER (OUTPATIENT)
Dept: RADIOLOGY | Facility: HOSPITAL | Age: 69
Discharge: HOME | End: 2024-11-19
Payer: COMMERCIAL

## 2024-11-19 VITALS — BODY MASS INDEX: 32.73 KG/M2 | WEIGHT: 221 LBS | HEIGHT: 69 IN

## 2024-11-19 DIAGNOSIS — Z96.652 HISTORY OF TOTAL LEFT KNEE REPLACEMENT: ICD-10-CM

## 2024-11-19 DIAGNOSIS — M17.10 ARTHRITIS OF KNEE: ICD-10-CM

## 2024-11-19 DIAGNOSIS — Z96.652 HISTORY OF TOTAL LEFT KNEE REPLACEMENT: Primary | ICD-10-CM

## 2024-11-19 PROCEDURE — 1159F MED LIST DOCD IN RCRD: CPT | Performed by: SPECIALIST

## 2024-11-19 PROCEDURE — 73562 X-RAY EXAM OF KNEE 3: CPT | Mod: LT

## 2024-11-19 PROCEDURE — 3008F BODY MASS INDEX DOCD: CPT | Performed by: SPECIALIST

## 2024-11-19 PROCEDURE — 99214 OFFICE O/P EST MOD 30 MIN: CPT | Performed by: SPECIALIST

## 2024-11-19 PROCEDURE — 1123F ACP DISCUSS/DSCN MKR DOCD: CPT | Performed by: SPECIALIST

## 2024-11-19 PROCEDURE — 73562 X-RAY EXAM OF KNEE 3: CPT | Mod: LEFT SIDE | Performed by: RADIOLOGY

## 2024-11-19 NOTE — PROGRESS NOTES
Follow up osteoporosis/osteoarthritis left knee  Patient is doing well overall, is 2 years out from total knee and several years out from total hip arthroplasty.  Is concerned about some other regions that do cause pain with activities.    Exam: Patient is alert and oriented x 3 no acute distress.  Mild discomfort/restricted motion left hip full range of motion normal stability left knee.  Right hip has full range of motion normal stability no elicited pain.  Dermis intact distal neurovascular intact.    Radiographs: Identify stable well aligned left total knee arthroplasty implant no effusion no bony implant lucencies.    Assessment plan: Status post remote left total knee and right total hip.  Mild ongoing left hip and right knee osteoarthritic change.  Because of his chronic osteo porosis/osteoarthritis he should remain on his current work restrictions which are rewritten.  Follow-up as needed.

## 2024-11-19 NOTE — LETTER
November 19, 2024     Patient: Michael Babb   YOB: 1955   Date of Visit: 11/19/2024       To Whom It May Concern:    It is my medical opinion that Michael Babb Needs to continue 8 hour work days, 40 hour work week until his next evaluation in 6 months,  due to multiple joint replacements and osteoarthritis..    If you have any questions or concerns, please don't hesitate to call.         Sincerely,        Baldo Garnica MD    CC: No Recipients

## 2024-12-13 DIAGNOSIS — E29.1 TESTICULAR HYPERGONADOTROPIC HYPOGONADISM: ICD-10-CM

## 2024-12-13 RX ORDER — TESTOSTERONE CYPIONATE 200 MG/ML
200 INJECTION, SOLUTION INTRAMUSCULAR
Qty: 2 ML | Refills: 2 | Status: SHIPPED | OUTPATIENT
Start: 2024-12-13 | End: 2025-03-13

## 2024-12-13 NOTE — TELEPHONE ENCOUNTER
Patient called needing refill on   testosterone cypionate (Depo-Testosterone) 200 mg/mL injection [[id number]]    Order Details  Dose: 200 mg Route: intramuscular Frequency: Every 14 days   Dispense Quantity: 2 mL Refills: 2          Sig: Inject 1 mL (200 mg) into the muscle every 14 (fourteen) days.   Pharmacy    [facility] #[id number] [facility] [address] AT [facility] [address]

## 2024-12-26 ENCOUNTER — TELEPHONE (OUTPATIENT)
Dept: PRIMARY CARE | Facility: CLINIC | Age: 69
End: 2024-12-26
Payer: COMMERCIAL

## 2024-12-26 DIAGNOSIS — I10 BENIGN ESSENTIAL HYPERTENSION: ICD-10-CM

## 2024-12-26 RX ORDER — TRIAMTERENE/HYDROCHLOROTHIAZID 37.5-25 MG
1 TABLET ORAL 2 TIMES DAILY
Qty: 90 TABLET | Refills: 3 | Status: SHIPPED | OUTPATIENT
Start: 2024-12-26

## 2024-12-26 NOTE — TELEPHONE ENCOUNTER
Patient called in for refill        triamterene-hydrochlorothiazid (Maxzide-25) 37.5-25 mg tablet [[id number]]    Order Details  Dose: 1 tablet Route: oral Frequency: 2 times daily   Dispense Quantity: 90 tablet Refills: 3          Sig: Take 1 tablet by mouth 2 times a day.   [facility] #[id number] [facility] [address] AT [facility] [address] [[id number]]

## 2024-12-31 NOTE — ADDENDUM NOTE
Encounter addended by: Izabella Holt RN on: 12/31/2024 5:13 PM   Actions taken: Check Out activity completed

## 2025-01-13 DIAGNOSIS — N52.9 ERECTILE DYSFUNCTION, UNSPECIFIED ERECTILE DYSFUNCTION TYPE: ICD-10-CM

## 2025-01-13 RX ORDER — TADALAFIL 20 MG/1
TABLET ORAL
Qty: 30 TABLET | Refills: 1 | Status: SHIPPED | OUTPATIENT
Start: 2025-01-13

## 2025-01-17 ENCOUNTER — TELEPHONE (OUTPATIENT)
Dept: PRIMARY CARE | Facility: CLINIC | Age: 70
End: 2025-01-17
Payer: COMMERCIAL

## 2025-01-20 PROBLEM — Z86.79 HISTORY OF HYPERTENSION: Status: RESOLVED | Noted: 2025-01-20 | Resolved: 2025-01-20

## 2025-01-20 PROBLEM — F41.9 ANXIETY: Status: ACTIVE | Noted: 2025-01-20

## 2025-01-20 PROBLEM — E29.1 HYPOGONADISM IN MALE: Status: ACTIVE | Noted: 2025-01-20

## 2025-01-20 PROBLEM — M70.60 GREATER TROCHANTERIC BURSITIS: Status: ACTIVE | Noted: 2025-01-20

## 2025-01-20 PROBLEM — M25.559 ARTHRALGIA OF HIP: Status: ACTIVE | Noted: 2025-01-20

## 2025-01-20 PROBLEM — J06.9 ACUTE UPPER RESPIRATORY INFECTION: Status: RESOLVED | Noted: 2025-01-20 | Resolved: 2025-01-20

## 2025-01-20 PROBLEM — Z96.649 HISTORY OF TOTAL HIP REPLACEMENT: Status: ACTIVE | Noted: 2025-01-20

## 2025-01-21 ENCOUNTER — APPOINTMENT (OUTPATIENT)
Dept: PRIMARY CARE | Facility: CLINIC | Age: 70
End: 2025-01-21
Payer: COMMERCIAL

## 2025-01-21 VITALS
SYSTOLIC BLOOD PRESSURE: 156 MMHG | BODY MASS INDEX: 36.29 KG/M2 | OXYGEN SATURATION: 95 % | DIASTOLIC BLOOD PRESSURE: 75 MMHG | HEIGHT: 69 IN | WEIGHT: 245 LBS | HEART RATE: 67 BPM

## 2025-01-21 DIAGNOSIS — E29.1 TESTICULAR HYPERGONADOTROPIC HYPOGONADISM: Primary | ICD-10-CM

## 2025-01-21 DIAGNOSIS — B18.2 CHRONIC HEPATITIS C WITHOUT HEPATIC COMA (MULTI): ICD-10-CM

## 2025-01-21 PROBLEM — G82.20 PARAPARESIS (MULTI): Status: RESOLVED | Noted: 2023-04-07 | Resolved: 2025-01-21

## 2025-01-21 PROCEDURE — 3008F BODY MASS INDEX DOCD: CPT | Performed by: NURSE PRACTITIONER

## 2025-01-21 PROCEDURE — 1036F TOBACCO NON-USER: CPT | Performed by: NURSE PRACTITIONER

## 2025-01-21 PROCEDURE — 80307 DRUG TEST PRSMV CHEM ANLYZR: CPT

## 2025-01-21 PROCEDURE — 1123F ACP DISCUSS/DSCN MKR DOCD: CPT | Performed by: NURSE PRACTITIONER

## 2025-01-21 PROCEDURE — 1160F RVW MEDS BY RX/DR IN RCRD: CPT | Performed by: NURSE PRACTITIONER

## 2025-01-21 PROCEDURE — 3078F DIAST BP <80 MM HG: CPT | Performed by: NURSE PRACTITIONER

## 2025-01-21 PROCEDURE — 80349 CANNABINOIDS NATURAL: CPT

## 2025-01-21 PROCEDURE — 1159F MED LIST DOCD IN RCRD: CPT | Performed by: NURSE PRACTITIONER

## 2025-01-21 PROCEDURE — 3077F SYST BP >= 140 MM HG: CPT | Performed by: NURSE PRACTITIONER

## 2025-01-21 PROCEDURE — 99213 OFFICE O/P EST LOW 20 MIN: CPT | Performed by: NURSE PRACTITIONER

## 2025-01-21 ASSESSMENT — ENCOUNTER SYMPTOMS
DEPRESSION: 0
LOSS OF SENSATION IN FEET: 0
OCCASIONAL FEELINGS OF UNSTEADINESS: 0

## 2025-01-21 ASSESSMENT — COLUMBIA-SUICIDE SEVERITY RATING SCALE - C-SSRS
6. HAVE YOU EVER DONE ANYTHING, STARTED TO DO ANYTHING, OR PREPARED TO DO ANYTHING TO END YOUR LIFE?: NO
2. HAVE YOU ACTUALLY HAD ANY THOUGHTS OF KILLING YOURSELF?: NO
1. IN THE PAST MONTH, HAVE YOU WISHED YOU WERE DEAD OR WISHED YOU COULD GO TO SLEEP AND NOT WAKE UP?: NO

## 2025-01-21 NOTE — PATIENT INSTRUCTIONS
I  recommend  that you try OTC Allegra 180mg daily or Zyrtec 10mg daily or Claritin 10mg for allergies. Continue taking all current medications as prescribed and follow-up in 3 months.

## 2025-01-22 ENCOUNTER — TELEPHONE (OUTPATIENT)
Dept: PRIMARY CARE | Facility: CLINIC | Age: 70
End: 2025-01-22
Payer: COMMERCIAL

## 2025-01-22 LAB
AMPHETAMINES UR QL SCN: ABNORMAL
BARBITURATES UR QL SCN: ABNORMAL
BENZODIAZ UR QL SCN: ABNORMAL
BZE UR QL SCN: ABNORMAL
CANNABINOIDS UR QL SCN: ABNORMAL
FENTANYL+NORFENTANYL UR QL SCN: ABNORMAL
METHADONE UR QL SCN: ABNORMAL
OPIATES UR QL SCN: ABNORMAL
OXYCODONE+OXYMORPHONE UR QL SCN: ABNORMAL
PCP UR QL SCN: ABNORMAL

## 2025-01-22 NOTE — PROGRESS NOTES
OARRS:  No data recorded  I have personally reviewed the OARRS report for Michael Babb. I have considered the risks of abuse, dependence, addiction and diversion    Is the patient prescribed a combination of a benzodiazepine and opioid?  No    Last Urine Drug Screen / ordered today: Yes  No results found for this or any previous visit (from the past 8760 hours).  N/A  Urine specimen collected for drug screening today, results pending.      Controlled Substance Agreement:  Date of the Last Agreement: 01/21/2025  Reviewed Controlled Substance Agreement including but not limited to the benefits, risks, and alternatives to treatment with a Controlled Substance medication(s).    Testosterone:  What is the patient's goal of therapy? Improve fatigue and ED  Is this being achieved with current treatment? Yes    I attest the patient does not have: Polycythemia    Last Testosterone check:  Testosterone, Free   Date Value Ref Range Status   07/08/2024 129.9 35.0 - 155.0 pg/mL Final     Comment:        This test was developed and its analytical performance  characteristics have been determined by [employer] [facility] [place], [place]. It has  not been cleared or approved by the U.S. Food and Drug  Administration. This assay has been validated pursuant  to the CLIA regulations and is used for clinical  purposes.        Testosterone   Date Value Ref Range Status   02/20/2023 452 240 - 1,000 ng/dL Final     Comment:      Nandrolone decanoate, 11 Beta-hydroxytestosterone, androstenedione,   testosterone propionate and 11-keto-testosterone strongly cross    react with this test method.    Biotin interference may cause falsely elevated results. Patients   taking a Biotin dose of up to 5 mg/day should refrain from taking   Biotin for 24 hours before sample collection. Providers may contact   their local laboratory for further information.       Testosterone, Total, LC-MS/MS   Date Value Ref Range Status   07/08/2024 656  "250 - 1100 ng/dL Final     Comment:        Men with clinically significant hypogonadal  symptoms and testosterone values repeatedly in  the range of the 200-300 ng/dL or less, may  benefit from testosterone treatment after  adequate risk and benefits counseling.            For additional information, please refer to  http://education.[employer]/faq/  [email or web address]  (This link is being provided for informational/  educational purposes only.)     This test was developed and its analytical performance  characteristics have been determined by [employer] [place], [place]. It has  not been cleared or approved by the U.S. Food and Drug  Administration. This assay has been validated pursuant  to the CLIA regulations and is used for clinical  purposes.          Last CBC:    No results found for: \"CBCDIF\", \"BMCBC\", \"PR1\"    Last PSA:   Prostate Specific AG   Date Value Ref Range Status   07/08/2024 0.58 <=4.00 ng/mL Final       Activities of Daily Living:   Is your overall impression that this patient is benefiting (symptom reduction outweighs side effects) from testosterone therapy? Yes     1. Physical Functioning: Same  2. Family Relationship: Same  3. Social Relationship: Same  4. Mood: Same  5. Sleep Patterns: Same  6. Overall Function: Worse    "

## 2025-01-22 NOTE — TELEPHONE ENCOUNTER
----- Message from Lucius Padilla sent at 1/22/2025  7:09 AM EST -----  Please tell patient that his drug screen is positive for cannabis and per  policy I cannot prescribe any controlled substance for him such as his testosterone.

## 2025-01-25 LAB — CARBOXYTHC UR-MCNC: 38 NG/ML

## 2025-01-29 ENCOUNTER — TELEPHONE (OUTPATIENT)
Dept: PRIMARY CARE | Facility: CLINIC | Age: 70
End: 2025-01-29
Payer: COMMERCIAL

## 2025-01-29 DIAGNOSIS — J06.9 ACUTE URI: Primary | ICD-10-CM

## 2025-01-29 RX ORDER — AZITHROMYCIN 250 MG/1
TABLET, FILM COATED ORAL
Qty: 6 TABLET | Refills: 0 | Status: SHIPPED | OUTPATIENT
Start: 2025-01-29 | End: 2025-02-03

## 2025-01-29 NOTE — TELEPHONE ENCOUNTER
Patient seen 01-  And is still complaining of runny nose and cough   For 3 weeks and is requesting z ramon or antibiotic  Be sent to [facility] in [place]

## 2025-02-20 ENCOUNTER — TELEPHONE (OUTPATIENT)
Dept: PRIMARY CARE | Facility: CLINIC | Age: 70
End: 2025-02-20
Payer: COMMERCIAL

## 2025-02-20 NOTE — TELEPHONE ENCOUNTER
Patient hasn't been feeling well. When he was seen at his appointment on 1/21 mentioned it to you. He went to the [place] and had to see a doctor. He was prescribed nasal spray, 2 other pills which he tossed out bottles for them, he thinks one was codeine which he's finished on 2/19 and also prescribed acebrothylline monteluskt and is to take for 10 days and is on day 7. He's still not feeling well with coughing(dry) wheezing. He wants to know if something else can be called in. Please advise. [facility]

## 2025-02-21 NOTE — TELEPHONE ENCOUNTER
I spoke with patient. Patient states he tried OTC allergy medication for about a week and no relief, he then verbalized he took the Zpak that was prescribed on 1/29. Then went to the [place] and had to get more medication. Patient did not pass his urine drug screen, we can not prescribed any codeine. I informed patient of this and he understands. I then called Lucius, as advised by Irma. Lucius is currently away from his computer, so he is unable to send anything right now. Irma was made aware of this. Patient was advised Lucius is away from his computer and not back in office until Tuesday. If patient is not feeling well from his cough he ideally should be evaluated. I advised patient to get evaluated at urgent care or even utilize   [facility] for virtual visit. Patient agrees he should go to urgent care.     Patient's only symptom is a dry cough, he denies any other symptoms other than feeling tired but he states he is feeling better each day.

## 2025-02-22 DIAGNOSIS — R05.1 ACUTE COUGH: ICD-10-CM

## 2025-02-22 DIAGNOSIS — R06.09 DYSPNEA ON EXERTION: Primary | ICD-10-CM

## 2025-02-22 DIAGNOSIS — R06.2 WHEEZING: ICD-10-CM

## 2025-02-22 DIAGNOSIS — R09.89 CHEST CONGESTION: ICD-10-CM

## 2025-02-22 RX ORDER — PREDNISONE 20 MG/1
TABLET ORAL
Qty: 21 TABLET | Refills: 0 | Status: SHIPPED | OUTPATIENT
Start: 2025-02-22

## 2025-02-22 RX ORDER — BROMPHENIRAMINE MALEATE, PSEUDOEPHEDRINE HYDROCHLORIDE, AND DEXTROMETHORPHAN HYDROBROMIDE 2; 30; 10 MG/5ML; MG/5ML; MG/5ML
10 SYRUP ORAL 4 TIMES DAILY PRN
Qty: 240 ML | Refills: 0 | Status: SHIPPED | OUTPATIENT
Start: 2025-02-22 | End: 2025-03-04

## 2025-02-22 RX ORDER — ALBUTEROL SULFATE 90 UG/1
2 INHALANT RESPIRATORY (INHALATION) EVERY 4 HOURS PRN
Qty: 8.5 G | Refills: 0 | Status: SHIPPED | OUTPATIENT
Start: 2025-02-22 | End: 2026-02-22

## 2025-03-07 DIAGNOSIS — R06.2 WHEEZING: ICD-10-CM

## 2025-03-07 DIAGNOSIS — R06.09 DYSPNEA ON EXERTION: ICD-10-CM

## 2025-03-07 RX ORDER — ALBUTEROL SULFATE 90 UG/1
2 INHALANT RESPIRATORY (INHALATION) EVERY 4 HOURS PRN
Qty: 6.7 G | Refills: 0 | Status: SHIPPED | OUTPATIENT
Start: 2025-03-07

## 2025-03-07 NOTE — TELEPHONE ENCOUNTER
Requested Prescriptions     Pending Prescriptions Disp Refills    albuterol 90 mcg/actuation inhaler [Pharmacy Med Name: ALBUTEROL HFA INH (200 PUFFS) 6.7GM] 6.7 g      Sig: INHALE 2 PUFFS BY MOUTH EVERY 4 HOURS AS NEEDED FOR WHEEZING OR SHORTNESS OF BREATH     LOV 1/21/25     NOV 5/22/25

## 2025-04-14 ENCOUNTER — APPOINTMENT (OUTPATIENT)
Dept: ALLERGY | Facility: CLINIC | Age: 70
End: 2025-04-14
Payer: COMMERCIAL

## 2025-04-14 VITALS
DIASTOLIC BLOOD PRESSURE: 80 MMHG | SYSTOLIC BLOOD PRESSURE: 145 MMHG | WEIGHT: 212 LBS | HEART RATE: 59 BPM | BODY MASS INDEX: 31.31 KG/M2

## 2025-04-14 DIAGNOSIS — J31.0 CHRONIC RHINITIS: ICD-10-CM

## 2025-04-14 DIAGNOSIS — J30.1 ALLERGIC RHINITIS DUE TO POLLEN, UNSPECIFIED SEASONALITY: Primary | ICD-10-CM

## 2025-04-14 DIAGNOSIS — H10.45 CHRONIC ALLERGIC CONJUNCTIVITIS: ICD-10-CM

## 2025-04-14 PROCEDURE — 99214 OFFICE O/P EST MOD 30 MIN: CPT | Performed by: ALLERGY & IMMUNOLOGY

## 2025-04-14 PROCEDURE — 3079F DIAST BP 80-89 MM HG: CPT | Performed by: ALLERGY & IMMUNOLOGY

## 2025-04-14 PROCEDURE — 3077F SYST BP >= 140 MM HG: CPT | Performed by: ALLERGY & IMMUNOLOGY

## 2025-04-14 PROCEDURE — 1123F ACP DISCUSS/DSCN MKR DOCD: CPT | Performed by: ALLERGY & IMMUNOLOGY

## 2025-04-14 RX ORDER — AZELASTINE 1 MG/ML
2 SPRAY, METERED NASAL 2 TIMES DAILY
Qty: 30 ML | Refills: 11 | Status: SHIPPED | OUTPATIENT
Start: 2025-04-14 | End: 2026-04-14

## 2025-04-14 NOTE — PROGRESS NOTES
"Subjective   Patient ID:   [id number]   Michael Babb is a 69 y.o. male who presents for Allergy Testing (Seen last fall for runny nose/sneezing, allergy tested & started on flonase.  Here today because he's not any better.).    Chief Complaint   Patient presents with    Allergy Testing     Seen last fall for runny nose/sneezing, allergy tested & started on flonase.  Here today because he's not any better.          HPI  This patient is here to evaluate for:    No help with flonase - did not do anything  No help with ipratropium nasal given 6/4/24  Blows nose 15 times in the AM  In evening, nasal congestion  3 sneezes in a row, \"massive sneeze\"    Not around strong smells; no perfumes.    Pmhx: 9/6/24 - Skin testing - Grass, low to tree pollen    previously reported:     For 3 years, he has been having allergy issues.   Getting worse this year.      Runny nose, sneezing, cough, throat clearing.      Triggers: cottonwood in the air.   Better in the winter.      Sinus infections - denied.      No sleep disturbance and no urgent care visits.      Medications that have not helped include: montelukast, cetirizine, azelastine.      Sh: golf player    Review of Systems   All other systems reviewed and are negative.        Objective     /80 (BP Location: Left arm, Patient Position: Sitting)   Pulse 59   Wt 96.2 kg (212 lb)   BMI 31.31 kg/m²      Physical Exam  Constitutional:       General: He is not in acute distress.     Appearance: Normal appearance. He is not ill-appearing.   HENT:      Head: Normocephalic and atraumatic.      Right Ear: Tympanic membrane, ear canal and external ear normal.      Left Ear: Tympanic membrane, ear canal and external ear normal.      Nose: Congestion present. No rhinorrhea.      Comments: Bilateral inferior turbinate hypertrophy and pale, septum unremarkable, no obvious polyps seen on anterior examination.      Mouth/Throat:      Mouth: Mucous membranes are moist.      Pharynx: " Oropharynx is clear. No oropharyngeal exudate or posterior oropharyngeal erythema.   Eyes:      General:         Right eye: No discharge.         Left eye: No discharge.      Conjunctiva/sclera: Conjunctivae normal.   Cardiovascular:      Rate and Rhythm: Normal rate and regular rhythm.      Heart sounds: Normal heart sounds. No murmur heard.     No friction rub. No gallop.   Pulmonary:      Effort: Pulmonary effort is normal. No respiratory distress.      Breath sounds: Normal breath sounds. No stridor. No wheezing, rhonchi or rales.   Chest:      Chest wall: No tenderness.   Abdominal:      General: Abdomen is flat.      Palpations: Abdomen is soft.   Musculoskeletal:         General: Normal range of motion.      Cervical back: Normal range of motion and neck supple.   Lymphadenopathy:      Cervical: No cervical adenopathy.   Skin:     General: Skin is warm and dry.      Findings: No erythema, lesion or rash.   Neurological:      General: No focal deficit present.      Mental Status: He is alert. Mental status is at baseline.   Psychiatric:         Mood and Affect: Mood normal.         Behavior: Behavior normal.         Thought Content: Thought content normal.         Judgment: Judgment normal.            Current Outpatient Medications   Medication Sig Dispense Refill    albuterol 90 mcg/actuation inhaler INHALE 2 PUFFS BY MOUTH EVERY 4 HOURS AS NEEDED FOR WHEEZING OR SHORTNESS OF BREATH 6.7 g 0    ergocalciferol (Vitamin D-2) 1.25 MG (94585 UT) capsule Take 1 capsule (50,000 Units) by mouth 1 (one) time per week.      ipratropium (Atrovent) 42 mcg (0.06 %) nasal spray Administer 2 sprays into each nostril 4 times a day for 10 days. 15 mL 12    losartan (Cozaar) 100 mg tablet Take 1 tablet (100 mg) by mouth once daily. 90 tablet 3    metoprolol succinate XL (Toprol-XL) 100 mg 24 hr tablet Take 1 tablet (100 mg) by mouth 2 times a day. 180 tablet 3    pantoprazole (ProtoNix) 40 mg EC tablet Take 1 tablet (40 mg) by  "mouth once daily. Do not crush, chew, or split. 90 tablet 3    predniSONE (Deltasone) 20 mg tablet Take 3 tabs (60mg) daily for 5 days, then take 2 tabs (40mg) daily for 2 days, then take 1 tab (20mg) daily for 2 days. 21 tablet 0    rosuvastatin (Crestor) 5 mg tablet Take 1 tablet (5 mg) by mouth once daily. 90 tablet 3    syringe with needle 3 mL 25 gauge x 1\" syringe To use for testosterone injection 100 each 1    tadalafil 20 mg tablet TAKE 1 TABLET(20 MG) BY MOUTH 1 HOUR BEFORE ACTIVITY AS NEEDED 30 tablet 1    testosterone cypionate (Depo-Testosterone) 200 mg/mL injection Inject 1 mL (200 mg) into the muscle every 14 (fourteen) days. 2 mL 2    triamterene-hydrochlorothiazid (Maxzide-25) 37.5-25 mg tablet Take 1 tablet by mouth 2 times a day. 90 tablet 3     No current facility-administered medications for this visit.       Summary of the labs over the past 6 months:    Office Visit on 01/21/2025   Component Date Value Ref Range Status    Amphetamine Screen, Urine 01/21/2025 Presumptive Negative  Presumptive Negative Final    Barbiturate Screen, Urine 01/21/2025 Presumptive Negative  Presumptive Negative Final    Benzodiazepines Screen, Urine 01/21/2025 Presumptive Negative  Presumptive Negative Final    Cannabinoid Screen, Urine 01/21/2025 Presumptive Positive (A)  Presumptive Negative Final    Cocaine Metabolite Screen, Urine 01/21/2025 Presumptive Negative  Presumptive Negative Final    Fentanyl Screen, Urine 01/21/2025 Presumptive Negative  Presumptive Negative Final    Opiate Screen, Urine 01/21/2025 Presumptive Negative  Presumptive Negative Final    Oxycodone Screen, Urine 01/21/2025 Presumptive Negative  Presumptive Negative Final    PCP Screen, Urine 01/21/2025 Presumptive Negative  Presumptive Negative Final    Methadone Screen, Urine 01/21/2025 Presumptive Negative  Presumptive Negative Final    68-Jbd-2-carboxy-THC, Urn, Quant 01/21/2025 38  ng/mL Final         Assessment/Plan   Diagnoses and all " orders for this visit:  Allergic rhinitis due to pollen, unspecified seasonality  -     azelastine (Astelin) 137 mcg (0.1 %) nasal spray; Administer 2 sprays into each nostril 2 times a day. Use in each nostril as directed  Chronic allergic conjunctivitis  Chronic rhinitis  -     azelastine (Astelin) 137 mcg (0.1 %) nasal spray; Administer 2 sprays into each nostril 2 times a day. Use in each nostril as directed    We discussed starting nasal saline rinses. You want to use distilled water to the salt packets. Make sure not to blow your nose out hard; also do not cover either nostril when blowing. Many people do this in the shower, then take your shower, and afterwards you may use your nasal sprays.    We reviewed how to use the nasal spray. Proper usage is important in order to deliver the medicine to where it needs to work and to avoid side effects or nasal irritation. Bend your head down looking at your toes because the nasal passageway goes straight back, not up. Point the spray at 10 and 2 o'clock and breath in through your nose just slightly. You will want to avoid the center part of the nose (septum). It may help to look in the mirror to check your technique. The medicine should not drip out but it is normal to taste it after several minutes.     If not better, will check intradermal tests and consider referral to ENT (for eval and possible imaging)    Toño Haley MD

## 2025-04-23 DIAGNOSIS — E29.1 TESTICULAR HYPERGONADOTROPIC HYPOGONADISM: ICD-10-CM

## 2025-04-23 RX ORDER — TESTOSTERONE CYPIONATE 200 MG/ML
INJECTION, SOLUTION INTRAMUSCULAR
Qty: 2 ML | Refills: 3 | OUTPATIENT
Start: 2025-04-23

## 2025-04-25 ENCOUNTER — APPOINTMENT (OUTPATIENT)
Dept: ALLERGY | Facility: CLINIC | Age: 70
End: 2025-04-25
Payer: COMMERCIAL

## 2025-04-25 ENCOUNTER — CLINICAL SUPPORT (OUTPATIENT)
Dept: PRIMARY CARE | Facility: CLINIC | Age: 70
End: 2025-04-25
Payer: COMMERCIAL

## 2025-04-25 DIAGNOSIS — Z79.899 MEDICATION MANAGEMENT: Primary | ICD-10-CM

## 2025-04-25 NOTE — PROGRESS NOTES
Subjective   Patient ID: Michael Babb is a 69 y.o. male who presents for Nurse Visit (Patient here for a drug screen).    HPI     Review of Systems    Objective   There were no vitals taken for this visit.    Physical Exam    Assessment/Plan

## 2025-04-26 LAB
AMPHETAMINES UR QL: NEGATIVE NG/ML
BARBITURATES UR QL: NEGATIVE NG/ML
BENZODIAZ UR QL: NEGATIVE NG/ML
BZE UR QL: NEGATIVE NG/ML
CREAT UR-MCNC: 123.1 MG/DL
FENTANYL UR QL SCN: NEGATIVE NG/ML
METHADONE UR QL: NEGATIVE NG/ML
OPIATES UR QL: NEGATIVE NG/ML
OXIDANTS UR QL: NEGATIVE MCG/ML
OXYCODONE UR QL: NEGATIVE NG/ML
PCP UR QL: NEGATIVE NG/ML
PH UR: 5.3 [PH] (ref 4.5–9)
QUEST NOTES AND COMMENTS: NORMAL
THC UR QL: NEGATIVE NG/ML

## 2025-04-27 DIAGNOSIS — E29.1 TESTICULAR HYPERGONADOTROPIC HYPOGONADISM: ICD-10-CM

## 2025-04-27 RX ORDER — TESTOSTERONE CYPIONATE 200 MG/ML
200 INJECTION, SOLUTION INTRAMUSCULAR
Qty: 2 ML | Refills: 2 | Status: SHIPPED | OUTPATIENT
Start: 2025-04-27 | End: 2025-04-30 | Stop reason: SDUPTHER

## 2025-04-28 ENCOUNTER — TELEPHONE (OUTPATIENT)
Dept: PRIMARY CARE | Facility: CLINIC | Age: 70
End: 2025-04-28
Payer: COMMERCIAL

## 2025-04-28 NOTE — TELEPHONE ENCOUNTER
----- Message from Lucius Padilla sent at 4/27/2025  9:51 AM EDT -----  Urine drug screen is normal.  Prescription for testosterone sent in.  ----- Message -----  From: [facility] Results In  Sent: 4/26/2025   5:32 AM EDT  To: Lucius Padilla, ELIZABETH-ANTON, DNP

## 2025-04-30 ENCOUNTER — TELEPHONE (OUTPATIENT)
Dept: PRIMARY CARE | Facility: CLINIC | Age: 70
End: 2025-04-30
Payer: COMMERCIAL

## 2025-04-30 DIAGNOSIS — E29.1 TESTICULAR HYPERGONADOTROPIC HYPOGONADISM: ICD-10-CM

## 2025-04-30 DIAGNOSIS — E78.5 DYSLIPIDEMIA (HIGH LDL; LOW HDL): ICD-10-CM

## 2025-04-30 RX ORDER — TESTOSTERONE CYPIONATE 200 MG/ML
200 INJECTION, SOLUTION INTRAMUSCULAR
Qty: 2 ML | Refills: 2 | Status: SHIPPED | OUTPATIENT
Start: 2025-04-30 | End: 2025-04-30 | Stop reason: SDUPTHER

## 2025-04-30 RX ORDER — TESTOSTERONE CYPIONATE 200 MG/ML
200 INJECTION, SOLUTION INTRAMUSCULAR
Qty: 2 ML | Refills: 2 | Status: SHIPPED | OUTPATIENT
Start: 2025-04-30 | End: 2025-05-01 | Stop reason: SDUPTHER

## 2025-04-30 RX ORDER — ROSUVASTATIN CALCIUM 5 MG/1
5 TABLET, COATED ORAL DAILY
Qty: 90 TABLET | Refills: 3 | Status: SHIPPED | OUTPATIENT
Start: 2025-04-30 | End: 2025-04-30 | Stop reason: SDUPTHER

## 2025-04-30 RX ORDER — TESTOSTERONE CYPIONATE 200 MG/ML
200 INJECTION, SOLUTION INTRAMUSCULAR
Qty: 2 ML | Refills: 2 | Status: SHIPPED | OUTPATIENT
Start: 2025-04-30 | End: 2025-04-30 | Stop reason: WASHOUT

## 2025-04-30 RX ORDER — ROSUVASTATIN CALCIUM 5 MG/1
5 TABLET, COATED ORAL DAILY
Qty: 90 TABLET | Refills: 3 | Status: SHIPPED | OUTPATIENT
Start: 2025-04-30 | End: 2025-04-30 | Stop reason: WASHOUT

## 2025-04-30 RX ORDER — ROSUVASTATIN CALCIUM 5 MG/1
5 TABLET, COATED ORAL DAILY
Qty: 90 TABLET | Refills: 3 | Status: SHIPPED | OUTPATIENT
Start: 2025-04-30 | End: 2025-05-01 | Stop reason: SDUPTHER

## 2025-04-30 NOTE — TELEPHONE ENCOUNTER
[facility] pharmacy closed in [place]    Testosterone needs to be sent to [facility]  Also needs syringes with needles 3 ml 25 gauge x 1 inch  & rosuvastatin 5 mg sent to [facility]

## 2025-05-01 DIAGNOSIS — E29.1 TESTICULAR HYPERGONADOTROPIC HYPOGONADISM: ICD-10-CM

## 2025-05-01 DIAGNOSIS — E78.5 DYSLIPIDEMIA (HIGH LDL; LOW HDL): ICD-10-CM

## 2025-05-01 RX ORDER — TESTOSTERONE CYPIONATE 200 MG/ML
200 INJECTION, SOLUTION INTRAMUSCULAR
Qty: 2 ML | Refills: 2 | Status: SHIPPED | OUTPATIENT
Start: 2025-05-01 | End: 2025-05-01 | Stop reason: SDUPTHER

## 2025-05-01 RX ORDER — ROSUVASTATIN CALCIUM 5 MG/1
5 TABLET, COATED ORAL DAILY
Qty: 90 TABLET | Refills: 3 | Status: SHIPPED | OUTPATIENT
Start: 2025-05-01 | End: 2025-05-01 | Stop reason: SDUPTHER

## 2025-05-01 RX ORDER — ROSUVASTATIN CALCIUM 5 MG/1
5 TABLET, COATED ORAL DAILY
Qty: 90 TABLET | Refills: 3 | Status: SHIPPED | OUTPATIENT
Start: 2025-05-01 | End: 2026-04-26

## 2025-05-01 RX ORDER — TESTOSTERONE CYPIONATE 200 MG/ML
200 INJECTION, SOLUTION INTRAMUSCULAR
Qty: 2 ML | Refills: 2 | Status: SHIPPED | OUTPATIENT
Start: 2025-05-01 | End: 2025-07-30

## 2025-05-01 NOTE — TELEPHONE ENCOUNTER
Prescription for the testosterone and [facility]suvastatin [facility]. They didn't receive it per patient.

## 2025-05-12 ENCOUNTER — TELEPHONE (OUTPATIENT)
Dept: PRIMARY CARE | Facility: CLINIC | Age: 70
End: 2025-05-12
Payer: COMMERCIAL

## 2025-05-12 DIAGNOSIS — J06.9 ACUTE URI: Primary | ICD-10-CM

## 2025-05-12 RX ORDER — BROMPHENIRAMINE MALEATE, PSEUDOEPHEDRINE HYDROCHLORIDE, AND DEXTROMETHORPHAN HYDROBROMIDE 2; 30; 10 MG/5ML; MG/5ML; MG/5ML
10 SYRUP ORAL 4 TIMES DAILY PRN
Qty: 240 ML | Refills: 0 | Status: SHIPPED | OUTPATIENT
Start: 2025-05-12 | End: 2025-05-22

## 2025-05-12 NOTE — TELEPHONE ENCOUNTER
Patient called and said he has a scratchy throat and burning sensation in his throat and is requesting an order for cough syrup that pcp has prescribed before please advise     [facility]/pharmacy #[id number] - [place], [place] - [address] [facility] [address]  Phone: [phone]  Fax: [phone]  ROBYN #: [id number]

## 2025-05-13 ENCOUNTER — OFFICE VISIT (OUTPATIENT)
Dept: PRIMARY CARE | Facility: CLINIC | Age: 70
End: 2025-05-13
Payer: COMMERCIAL

## 2025-05-13 VITALS
SYSTOLIC BLOOD PRESSURE: 129 MMHG | DIASTOLIC BLOOD PRESSURE: 73 MMHG | WEIGHT: 218 LBS | OXYGEN SATURATION: 92 % | HEART RATE: 72 BPM | BODY MASS INDEX: 32.19 KG/M2

## 2025-05-13 DIAGNOSIS — Z00.00 ANNUAL PHYSICAL EXAM: Primary | ICD-10-CM

## 2025-05-13 DIAGNOSIS — N40.0 BENIGN PROSTATIC HYPERPLASIA, UNSPECIFIED WHETHER LOWER URINARY TRACT SYMPTOMS PRESENT: ICD-10-CM

## 2025-05-13 DIAGNOSIS — J06.9 URI WITH COUGH AND CONGESTION: ICD-10-CM

## 2025-05-13 DIAGNOSIS — E29.1 HYPOGONADISM IN MALE: ICD-10-CM

## 2025-05-13 DIAGNOSIS — J02.9 SORE THROAT: ICD-10-CM

## 2025-05-13 DIAGNOSIS — E55.9 VITAMIN D DEFICIENCY: ICD-10-CM

## 2025-05-13 DIAGNOSIS — E78.5 DYSLIPIDEMIA (HIGH LDL; LOW HDL): ICD-10-CM

## 2025-05-13 DIAGNOSIS — I10 BENIGN ESSENTIAL HYPERTENSION: ICD-10-CM

## 2025-05-13 LAB
POC RAPID INFLUENZA A: NEGATIVE
POC RAPID INFLUENZA B: NEGATIVE
POC RAPID STREP: NEGATIVE
POC SARS-COV-2 AG BINAX: NORMAL

## 2025-05-13 PROCEDURE — 87880 STREP A ASSAY W/OPTIC: CPT | Performed by: NURSE PRACTITIONER

## 2025-05-13 PROCEDURE — 87811 SARS-COV-2 COVID19 W/OPTIC: CPT | Performed by: NURSE PRACTITIONER

## 2025-05-13 PROCEDURE — 3074F SYST BP LT 130 MM HG: CPT | Performed by: NURSE PRACTITIONER

## 2025-05-13 PROCEDURE — 87804 INFLUENZA ASSAY W/OPTIC: CPT | Performed by: NURSE PRACTITIONER

## 2025-05-13 PROCEDURE — 3078F DIAST BP <80 MM HG: CPT | Performed by: NURSE PRACTITIONER

## 2025-05-13 PROCEDURE — 1160F RVW MEDS BY RX/DR IN RCRD: CPT | Performed by: NURSE PRACTITIONER

## 2025-05-13 PROCEDURE — 1159F MED LIST DOCD IN RCRD: CPT | Performed by: NURSE PRACTITIONER

## 2025-05-13 PROCEDURE — 1036F TOBACCO NON-USER: CPT | Performed by: NURSE PRACTITIONER

## 2025-05-13 PROCEDURE — 99214 OFFICE O/P EST MOD 30 MIN: CPT | Performed by: NURSE PRACTITIONER

## 2025-05-13 ASSESSMENT — ANXIETY QUESTIONNAIRES
6. BECOMING EASILY ANNOYED OR IRRITABLE: NOT AT ALL
2. NOT BEING ABLE TO STOP OR CONTROL WORRYING: NOT AT ALL
1. FEELING NERVOUS, ANXIOUS, OR ON EDGE: NOT AT ALL
4. TROUBLE RELAXING: NOT AT ALL
3. WORRYING TOO MUCH ABOUT DIFFERENT THINGS: NOT AT ALL
5. BEING SO RESTLESS THAT IT IS HARD TO SIT STILL: NOT AT ALL
7. FEELING AFRAID AS IF SOMETHING AWFUL MIGHT HAPPEN: NOT AT ALL
GAD7 TOTAL SCORE: 0

## 2025-05-13 ASSESSMENT — ENCOUNTER SYMPTOMS
OCCASIONAL FEELINGS OF UNSTEADINESS: 0
COUGH: 1
LOSS OF SENSATION IN FEET: 0
DEPRESSION: 0
MYALGIAS: 1

## 2025-05-13 NOTE — ASSESSMENT & PLAN NOTE
Check testosterone levels.  In the interim, continue current dose of testosterone supplementation.

## 2025-05-13 NOTE — ASSESSMENT & PLAN NOTE
Patient swabs were negative for COVID, influenza and strep throat.  Continue Bromfed-DM as prescribed with adequate hydration and rest.

## 2025-05-13 NOTE — PROGRESS NOTES
Subjective   Patient ID: Michael Babb is a 69 y.o. male who presents for URI (Sore throat, chest congestion, cough, muscle aches, malaise x week ).    Patient is presenting with complaint of malaise, sore throat, chest congestion, muscle aches and nonproductive cough for about a week.  He denies fever, chest pain, shortness of breath or wheezing.  Patient was prescribed Bromfed-DM yesterday and he took the first dose last night.      URI   Associated symptoms include congestion and coughing.        Review of Systems   HENT:  Positive for congestion.    Respiratory:  Positive for cough.    Musculoskeletal:  Positive for myalgias.   All other systems reviewed and are negative.      Objective   /73 (BP Location: Left arm, Patient Position: Sitting, BP Cuff Size: Large adult)   Pulse 72   Wt 98.9 kg (218 lb)   SpO2 92%   BMI 32.19 kg/m²     Physical Exam  Constitutional:       Appearance: He is obese.   HENT:      Head: Normocephalic and atraumatic.      Right Ear: External ear normal.      Left Ear: External ear normal.      Nose: Nose normal.      Mouth/Throat:      Mouth: Mucous membranes are moist.   Cardiovascular:      Rate and Rhythm: Normal rate and regular rhythm.      Pulses: Normal pulses.      Heart sounds: Normal heart sounds.   Pulmonary:      Effort: Pulmonary effort is normal.      Breath sounds: Normal breath sounds.   Musculoskeletal:      Cervical back: Neck supple.   Skin:     General: Skin is warm and dry.   Neurological:      General: No focal deficit present.      Mental Status: He is alert and oriented to person, place, and time.   Psychiatric:         Mood and Affect: Mood normal.         Behavior: Behavior normal.         Thought Content: Thought content normal.         Judgment: Judgment normal.         Assessment/Plan   Problem List Items Addressed This Visit       Benign essential hypertension    Relevant Orders    CBC and Auto Differential    Comprehensive Metabolic Panel    BPH  (benign prostatic hyperplasia)    Relevant Orders    PSA    Dyslipidemia (high LDL; low HDL)    Relevant Orders    Comprehensive Metabolic Panel    Lipid panel    TSH    Vitamin D deficiency    Relevant Orders    Vitamin D 25-Hydroxy,Total (for eval of Vitamin D levels)    Hypogonadism in male    Relevant Orders    Testosterone, total and free     Other Visit Diagnoses         Annual physical exam    -  Primary    Relevant Orders    Follow Up In [facility] - PCP - Health Maintenance      URI with cough and congestion        Relevant Orders    POCT Rapid Strep A manually resulted (Completed)    POCT BinaxNOW Covid-19 Ag Card manually resulted (Completed)    POCT Influenza A/B manually resulted (Completed)      Sore throat        Relevant Orders    POCT Rapid Strep A manually resulted (Completed)

## 2025-05-15 ENCOUNTER — TELEPHONE (OUTPATIENT)
Dept: PRIMARY CARE | Facility: CLINIC | Age: 70
End: 2025-05-15
Payer: COMMERCIAL

## 2025-05-15 DIAGNOSIS — J06.9 URI WITH COUGH AND CONGESTION: Primary | ICD-10-CM

## 2025-05-15 RX ORDER — AZITHROMYCIN 250 MG/1
TABLET, FILM COATED ORAL
Qty: 6 TABLET | Refills: 0 | Status: SHIPPED | OUTPATIENT
Start: 2025-05-15 | End: 2025-05-20

## 2025-05-15 NOTE — TELEPHONE ENCOUNTER
Patient saw Lucius on Wednesday of last week and told patient to see how he is doing on the cough syrup. Patient was to call in today and let us know how he is doing. Patient says he thinks he's getting a little better but he still has a heavy chest and a deep cough.   Please advise  [facility] in [place]

## 2025-05-22 ENCOUNTER — APPOINTMENT (OUTPATIENT)
Dept: PRIMARY CARE | Facility: CLINIC | Age: 70
End: 2025-05-22
Payer: COMMERCIAL

## 2025-05-23 ENCOUNTER — TELEPHONE (OUTPATIENT)
Dept: PRIMARY CARE | Facility: CLINIC | Age: 70
End: 2025-05-23
Payer: COMMERCIAL

## 2025-05-23 DIAGNOSIS — R05.1 ACUTE COUGH: Primary | ICD-10-CM

## 2025-05-23 RX ORDER — CODEINE PHOSPHATE AND GUAIFENESIN 10; 100 MG/5ML; MG/5ML
10 SOLUTION ORAL EVERY 8 HOURS PRN
Qty: 120 ML | Refills: 0 | Status: SHIPPED | OUTPATIENT
Start: 2025-05-23 | End: 2025-05-27

## 2025-05-23 NOTE — TELEPHONE ENCOUNTER
Patient says that he is still coughing, he doesn't have the wheezing as much as he did before, and still a sore throat which he thinks he may be from the cough.

## 2025-05-27 ENCOUNTER — TELEPHONE (OUTPATIENT)
Dept: PRIMARY CARE | Facility: CLINIC | Age: 70
End: 2025-05-27
Payer: COMMERCIAL

## 2025-05-27 DIAGNOSIS — R05.1 ACUTE COUGH: Primary | ICD-10-CM

## 2025-05-27 RX ORDER — ALBUTEROL SULFATE 90 UG/1
2 INHALANT RESPIRATORY (INHALATION) EVERY 4 HOURS PRN
Qty: 8 G | Refills: 0 | Status: SHIPPED | OUTPATIENT
Start: 2025-05-27

## 2025-05-27 NOTE — TELEPHONE ENCOUNTER
[facility] not [facility] codeine anymore due to so many fake scripts. They carry the Brom-fed or hydrocodone with triptan?

## 2025-05-27 NOTE — PROGRESS NOTES
[facility]  [address]  [facility]  [address]  [facility] [id number]     [facility]  Follow Up Visit Note      Subjective   Patient ID: Michael Babb is a 69 y.o. male with past medical history significant for Obesity, Hypertension, Anxiety, and Obstructive sleep apnea.    6/3/2025: UPDATED: The patient is here alone today and presents for a follow-up of SHERMAN after the new machine has been set up.  He is doing good with his new pap and needs new supplies. I provided him with an N30i small frame and medium cushion sample of a mask to try in the clinic; he reported being comfortable with it.  He reports that his snoring is decreasing, but he still snores occasionally. I checked his mask and realized it was leaking, so I added a chin strap to secure it, rather than using mouth tape. The patient verbalized understanding of it. His pap compliance rate over 4 hours was 100%, and his ESS is 3 today.       10/3/24: The patient returned to the clinic to review his sleep study to treat his sleep apnea. The desensitization strategy, 30-day free mask return policy, and insurance requirements are all discussed with the patient. The patient verbalized understanding it. He has been using his pap for 24-25 years faithfully. His over 4 hours pap compliance rate was  100   %, and His ESS  is  2 today. I provided him with a sample of Dream Wear a nasal mask to try in the clinic; he reports being comfortable with it. We also discussed the mouth-sealing strategy to prevent air leaking, and he would like to purchase ZZZ tape.      7/16/24:  The patient returned to the clinic to review his pap compliance after adjusting the pressure setting. He bought his old machine, which he purchased himself many years ago, and would like to get a new one from the insurance company. His old pap compliance for over four hours usage rate  was 100 %, and His ESS  is  0 today. He understood and agreed to do a home sleep study and confirm his OBSTRUCTIVE SLEEP APNEA diagnosis to get a new pap continuously to treat his SHERMAN.      5/9/24:  He brought his Yoshi pap to the clinic. The previous setting was 15 CWP; I adjusted it to 18 CWP in the clinic because it needed more pressure and woke him up at night. He felt comfortable. I also provided a small sample mask of Yoshi's dreamwear under the nose small size in the clinic. His Ess is one today.      3/19/2024: The Patient is alone today for a comprehensive sleep medicine evaluation due to sleep apnea on pap. Today, he came to the office to ask for another pap because he has faithfully used pap and needs a new machine. He has a provider at [facility]. However, the provider moves out and needs to find another provider to establish care. He recently felt an extremely dry mouth when he woke up and required more pressure to wake him up at night. ESS: 0 JAVIER: 10, and neck circumference is 16 inches today.     HPI  The Patient had been having these symptoms for the past 25 years. The Patient was diagnosed with SHERMAN in 2019 by PSG and has started on CPAP since then. Currently on auto-CPAP 18 cm H2O with EPR/Flex 1 and nasal pillow mask through [facility]. The Patient has been using the machine every night.      SLEEP STUDY HISTORY: (personally reviewed raw data such as interpretation report, data sheet, hypnogram, and titration table if available and applicable)  4/16/2019: pap titration: AHI : 82.7, [place]: 77%, consider CPAP 15cm H2O   7/31/2024   The REI3% was 72.5/hr. The REI4% was 62.5/hr. JEFF was 0.1/hr. The supine REI3% was 74.2; non-supine REI3% was 67.6. The supine REI4% was 64.5; non-supine REI4% as 56.8. During sleep, based on REI3%, the breathing pattern demonstrated severe sleep disordered breathing, characterized predominantly by obstructive events. The mean oxygen saturation was 92% during the monitoring time. The  oxygen saturation was <88% for 53 minutes. The SpO2 michael was 81%.      SLEEP-WAKE SCHEDULE  Bedtime: 10 PM on weekdays, 11 PM on weekends  Subjective sleep latency: 10 minutes  Problems falling asleep: no  Number of awakenings: 2-3 times per night spontaneously for urination  Falls back asleep in 10 minutes  Problems staying asleep: no  Final wake time: 5:30 AM on weekdays, 7 AM on weekends  Out of bed time: 5:40 AM on weekdays, 7 AM on weekends  Shift work: no  Naps: no  Average sleep duration (excluding naps): 7 hours     SLEEP ENVIRONMENT  Sleep location: bed  Sleep status: sleeps with girl friend  Room is dark:  Yes  Room is quiet: Yes  Room is cool: Yes  Bed comfort: good     SLEEP HABITS:   Activities before bedtime: watch TV  Activities in bed: no   Preferred sleep position: back     SLEEP ROS: (after cpap)  Night symptoms: POSITIVE for snoring before PAP but not now and mouth breathing  Morning symptoms: POSITIVE for unrefreshing sleep and morning dry mouth  Daytime symptoms POSITIVE for excessive daytime sleepiness before PAP but not now  Hypersomnia / narcolepsy symptoms: Patient denies symptoms of a hypersomnolence disorder such as sleep paralysis, sleep-related hallucinations, recurrent sleep attacks, automatic behaviors, and cataplexy.   RLS symptoms: Patient denies RLS symptoms.  Movements in sleep: Patient denies problematic movements in sleep.  Parasomnia symptoms: Patient denies symptoms of parasomnia.     WEIGHT: stable     REVIEW OF SYSTEMS: All other systems have been reviewed and are negative.     PERTINENT SOCIAL HISTORY:  Occupation: post office mailman  Smoking: No   ETOH: Yes   Marijuana: No   Caffeine: Yes  Sleep aids: No   Claustrophobia: No      PERTINENT PAST SURGICAL HISTORY:  uvulectomy     PERTINENT FAMILY HISTORY:  Brother: Obstructive sleep apnea, die for stroke     Active Problems, Allergy List, Medication List, and PMH/PSH/FH/Social Hx have been reviewed and reconciled in chart.  No significant changes unless documented in the pertinent chart section. Updates made when necessary.     REVIEW OF SYSTEMS  All other systems have been reviewed and are negative.    ALLERGIES  Allergies[1]    MEDICATIONS  Current Medications[2]    Objective     Vitals:    06/03/25 1554   BP: 130/82   Pulse: 70   Resp: 16   Temp: 36.1 °C (97 °F)   SpO2: 95%        Physical Exam  Constitutional: Awake, not in distress  Lungs: Clear to auscultation bilateral, no rales  Heart: Regular rate and rhythm, no murmurs  Skin: Warm, no rash  Neuro: No tremors, moves all extremities  Psych: alert and oriented to time, place, and person    PAP Adherence:  DURABLE MEDICAL EQUIPMENT COMPANY: [employer]  Machine: THERAPY: RESMED AIRSENSE 11 PRESSURE SETTINGS: 20 cm H2O  Mask: Airtouch N30i Small /medium  Issues with therapy: ISSUES WITH THERAPY: denies  Benefits with PAP: PERCEIVED BENEFITS OF PAP: refreshing sleep reduced daytime sleepiness decreased or no snoring better sleep quality    A PAP adherence download was obtained and data was reviewed personally today in clinic. (see scanned document in EPIC)         Assessment/Plan   Michael Babb is a 69 y.o. male presents today in [facility] with the following problems:    # OBSTRUCTIVE SLEEP APNEA:  -Great compliance, and continue 20 CWP CPAP with Airtouch N30i small/medium mask, add chin strap to prevent air leak and renew supplies via [employer]  -I provide him a sample of N30i small cushion/ medium mask to try in the clinic, he  report being comfortable with it.   -Sleep apnea and PAP therapy education were provided at length in the clinic today. Michael WALLY Skinny verbalized understanding.  -Emphasized diet, exercise, and weight loss in the clinic, as were non-supine sleep, avoiding alcohol in the late evening, and driving or operating heavy machinery when sleepy.  -Michael Campuzanooverbalizes understanding of the above  "instructions and risks.     # HYPERTENSION:  -Blood pressure was 130/82 today.  -Denies headache, palpitation, and syncope in the clinic.  -Follows with PCP/ Cardiology     # OBESITY :  -with a BMI of 32.19 Michael Babb most recent Bicarbonate was 29            Bicarbonate   Date Value Ref Range Status   10/04/2023 29 21 - 32 mmol/L Final   -Encourage to have regular exercise to manage weight well.     # XEROSTOMIA:  -Fell better        RTC 1 year     All of patient's questions were answered. He verbalizes understanding and agreement with my assessment and plan.    Please excuse any errors in grammar or translation related to dictation.           [1]   Allergies  Allergen Reactions    Lisinopril Unknown   [2]   Current Outpatient Medications   Medication Sig Dispense Refill    codeine-guaifenesin (Robitussin-AC)  mg/5 mL syrup Take 10 mL by mouth every 8 hours if needed for cough for up to 4 days. 120 mL 0    ergocalciferol (Vitamin D-2) 1.25 MG (48604 UT) capsule Take 1 capsule (50,000 Units) by mouth 1 (one) time per week.      losartan (Cozaar) 100 mg tablet Take 1 tablet (100 mg) by mouth once daily. 90 tablet 3    metoprolol succinate XL (Toprol-XL) 100 mg 24 hr tablet Take 1 tablet (100 mg) by mouth 2 times a day. 180 tablet 3    pantoprazole (ProtoNix) 40 mg EC tablet Take 1 tablet (40 mg) by mouth once daily. Do not crush, chew, or split. 90 tablet 3    rosuvastatin (Crestor) 5 mg tablet Take 1 tablet (5 mg) by mouth once daily. 90 tablet 3    syringe with needle 3 mL 25 gauge x 1\" syringe To use for testosterone injection 6 each 1    tadalafil 20 mg tablet TAKE 1 TABLET(20 MG) BY MOUTH 1 HOUR BEFORE ACTIVITY AS NEEDED 30 tablet 1    testosterone cypionate (Depo-Testosterone) 200 mg/mL injection Inject 1 mL (200 mg) into the muscle every 14 (fourteen) days. 2 mL 2    triamterene-hydrochlorothiazid (Maxzide-25) 37.5-25 mg tablet Take 1 tablet by mouth 2 times a day. 90 tablet 3     No current " facility-administered medications for this visit.

## 2025-06-03 ENCOUNTER — OFFICE VISIT (OUTPATIENT)
Dept: SLEEP MEDICINE | Facility: CLINIC | Age: 70
End: 2025-06-03
Payer: COMMERCIAL

## 2025-06-03 VITALS
HEART RATE: 70 BPM | OXYGEN SATURATION: 95 % | RESPIRATION RATE: 16 BRPM | TEMPERATURE: 97 F | SYSTOLIC BLOOD PRESSURE: 130 MMHG | DIASTOLIC BLOOD PRESSURE: 82 MMHG | BODY MASS INDEX: 32.29 KG/M2 | HEIGHT: 69 IN | WEIGHT: 218 LBS

## 2025-06-03 DIAGNOSIS — G47.33 OSA (OBSTRUCTIVE SLEEP APNEA): ICD-10-CM

## 2025-06-03 DIAGNOSIS — J30.9 ALLERGIC RHINITIS, UNSPECIFIED SEASONALITY, UNSPECIFIED TRIGGER: ICD-10-CM

## 2025-06-03 DIAGNOSIS — G47.33 OBSTRUCTIVE SLEEP APNEA (ADULT) (PEDIATRIC): Primary | ICD-10-CM

## 2025-06-03 DIAGNOSIS — F41.9 ANXIETY: ICD-10-CM

## 2025-06-03 DIAGNOSIS — I10 BENIGN ESSENTIAL HYPERTENSION: ICD-10-CM

## 2025-06-03 DIAGNOSIS — R00.0 TACHYCARDIA: ICD-10-CM

## 2025-06-03 DIAGNOSIS — K21.9 GASTROESOPHAGEAL REFLUX DISEASE WITHOUT ESOPHAGITIS: ICD-10-CM

## 2025-06-03 PROCEDURE — 3008F BODY MASS INDEX DOCD: CPT

## 2025-06-03 PROCEDURE — 99213 OFFICE O/P EST LOW 20 MIN: CPT

## 2025-06-03 PROCEDURE — 1036F TOBACCO NON-USER: CPT

## 2025-06-03 PROCEDURE — 3075F SYST BP GE 130 - 139MM HG: CPT

## 2025-06-03 PROCEDURE — 1159F MED LIST DOCD IN RCRD: CPT

## 2025-06-03 PROCEDURE — 1160F RVW MEDS BY RX/DR IN RCRD: CPT

## 2025-06-03 PROCEDURE — 3079F DIAST BP 80-89 MM HG: CPT

## 2025-06-03 ASSESSMENT — SLEEP AND FATIGUE QUESTIONNAIRES
HOW LIKELY ARE YOU TO NOD OFF OR FALL ASLEEP IN A CAR, WHILE STOPPED FOR A FEW MINUTES IN TRAFFIC: WOULD NEVER DOZE
SITING INACTIVE IN A PUBLIC PLACE LIKE A CLASS ROOM OR A MOVIE THEATER: WOULD NEVER DOZE
HOW LIKELY ARE YOU TO NOD OFF OR FALL ASLEEP WHILE LYING DOWN TO REST IN THE AFTERNOON WHEN CIRCUMSTANCES PERMIT: HIGH CHANCE OF DOZING
ESS-CHAD TOTAL SCORE: 3
HOW LIKELY ARE YOU TO NOD OFF OR FALL ASLEEP WHILE SITTING AND TALKING TO SOMEONE: WOULD NEVER DOZE
HOW LIKELY ARE YOU TO NOD OFF OR FALL ASLEEP WHILE WATCHING TV: WOULD NEVER DOZE
HOW LIKELY ARE YOU TO NOD OFF OR FALL ASLEEP WHILE SITTING AND READING: WOULD NEVER DOZE
HOW LIKELY ARE YOU TO NOD OFF OR FALL ASLEEP WHILE SITTING QUIETLY AFTER LUNCH WITHOUT ALCOHOL: WOULD NEVER DOZE
HOW LIKELY ARE YOU TO NOD OFF OR FALL ASLEEP WHEN YOU ARE A PASSENGER IN A CAR FOR AN HOUR WITHOUT A BREAK: WOULD NEVER DOZE

## 2025-06-09 ENCOUNTER — TELEPHONE (OUTPATIENT)
Dept: PRIMARY CARE | Facility: CLINIC | Age: 70
End: 2025-06-09
Payer: COMMERCIAL

## 2025-06-09 DIAGNOSIS — E29.1 TESTICULAR HYPERGONADOTROPIC HYPOGONADISM: ICD-10-CM

## 2025-06-24 ENCOUNTER — TELEPHONE (OUTPATIENT)
Dept: DERMATOLOGY | Facility: CLINIC | Age: 70
End: 2025-06-24
Payer: COMMERCIAL

## 2025-07-01 ENCOUNTER — TELEPHONE (OUTPATIENT)
Dept: PRIMARY CARE | Facility: CLINIC | Age: 70
End: 2025-07-01
Payer: COMMERCIAL

## 2025-07-01 DIAGNOSIS — I10 BENIGN ESSENTIAL HYPERTENSION: ICD-10-CM

## 2025-07-01 RX ORDER — TRIAMTERENE AND HYDROCHLOROTHIAZIDE 37.5; 25 MG/1; MG/1
1 TABLET ORAL 2 TIMES DAILY
Qty: 90 TABLET | Refills: 3 | Status: SHIPPED | OUTPATIENT
Start: 2025-07-01

## 2025-07-01 RX ORDER — METOPROLOL SUCCINATE 100 MG/1
100 TABLET, EXTENDED RELEASE ORAL 2 TIMES DAILY
Qty: 180 TABLET | Refills: 3 | Status: SHIPPED | OUTPATIENT
Start: 2025-07-01

## 2025-07-01 NOTE — TELEPHONE ENCOUNTER
metoprolol succinate XL (Toprol-XL) 100 mg 24 hr tablet [[id number]]    Order Details  Dose: 100 mg Route: oral Frequency: 2 times daily   Dispense Quantity: 180 tablet (90 day supply) Refills: 3    Duration: -- Dispense As Written: No          Sig: Take 1 tablet (100 mg) by mouth 2 times a day.         Start Date: 06/19/24 End Date: --   Written Date: 06/19/24 Rx Expiration Date: 06/19/25        Associated Diagnoses: Benign essential hypertension [I10]   Original Order: metoprolol succinate XL (Toprol-XL) 100 mg 24 hr tablet [[id number]]     [facility]  Last office visit: 5/13/25  Next office visit: 7/29/25

## 2025-07-01 NOTE — TELEPHONE ENCOUNTER
Requested Prescriptions     Pending Prescriptions Disp Refills    metoprolol succinate XL (Toprol-XL) 100 mg 24 hr tablet 180 tablet 3     Sig: Take 1 tablet (100 mg) by mouth 2 times a day.     Lov 5/13/25    Nov 7/29/25

## 2025-07-03 ENCOUNTER — APPOINTMENT (OUTPATIENT)
Dept: ALLERGY | Facility: CLINIC | Age: 70
End: 2025-07-03
Payer: COMMERCIAL

## 2025-07-03 VITALS — DIASTOLIC BLOOD PRESSURE: 63 MMHG | SYSTOLIC BLOOD PRESSURE: 123 MMHG | BODY MASS INDEX: 33.2 KG/M2 | WEIGHT: 224.8 LBS

## 2025-07-03 DIAGNOSIS — J30.1 ALLERGIC RHINITIS DUE TO POLLEN, UNSPECIFIED SEASONALITY: Primary | ICD-10-CM

## 2025-07-03 DIAGNOSIS — J31.0 CHRONIC RHINITIS: ICD-10-CM

## 2025-07-03 DIAGNOSIS — H10.45 CHRONIC ALLERGIC CONJUNCTIVITIS: ICD-10-CM

## 2025-07-03 DIAGNOSIS — J32.8 OTHER CHRONIC SINUSITIS: ICD-10-CM

## 2025-07-03 PROCEDURE — 3074F SYST BP LT 130 MM HG: CPT | Performed by: ALLERGY & IMMUNOLOGY

## 2025-07-03 PROCEDURE — 3078F DIAST BP <80 MM HG: CPT | Performed by: ALLERGY & IMMUNOLOGY

## 2025-07-03 PROCEDURE — 99214 OFFICE O/P EST MOD 30 MIN: CPT | Performed by: ALLERGY & IMMUNOLOGY

## 2025-07-03 RX ORDER — CETIRIZINE HYDROCHLORIDE 10 MG/1
10 TABLET ORAL DAILY PRN
Qty: 30 TABLET | Refills: 11 | Status: SHIPPED | OUTPATIENT
Start: 2025-07-03 | End: 2026-07-03

## 2025-07-03 NOTE — PROGRESS NOTES
Subjective   Patient ID:   [id number]   Michael Babb is a 70 y.o. male who presents for sneezing (Pt is still having the same issues as before with sneezing, runny nose and occasionally a cough. Tried over the counter anti-histamines do not work.).    Chief Complaint   Patient presents with    sneezing     Pt is still having the same issues as before with sneezing, runny nose and occasionally a cough. Tried over the counter anti-histamines do not work.          HPI  This patient is here to evaluate for:  Allergic rhinitis and conjunctivitis, rhinosinusitis.    Sinus rinses recommended for 30 days. Did not help very much.    No help with flonase - did not do anything  No help with ipratropium nasal given 6/4/24    Still having massive sneezes, and mucus production.    Worse in the summer.  In the winter, he is still having symptoms but not clear how bad it is.       Review of Systems   All other systems reviewed and are negative.        Objective     /63 (BP Location: Left arm, Patient Position: Sitting, BP Cuff Size: Adult)   Wt 102 kg (224 lb 12.8 oz)   BMI 33.20 kg/m²      Physical Exam  Constitutional:       General: He is not in acute distress.     Appearance: Normal appearance. He is not ill-appearing.   HENT:      Head: Normocephalic and atraumatic.      Right Ear: Tympanic membrane, ear canal and external ear normal.      Left Ear: Tympanic membrane, ear canal and external ear normal.      Nose: Congestion present. No rhinorrhea.      Comments: Bilateral inferior turbinate hypertrophy and right mild septum deviation, no obvious polyps seen on anterior examination. Right inferior turbinate hypertrophy      Mouth/Throat:      Mouth: Mucous membranes are moist.      Pharynx: Oropharynx is clear. No oropharyngeal exudate or posterior oropharyngeal erythema.   Eyes:      General:         Right eye: No discharge.         Left eye: No discharge.      Conjunctiva/sclera: Conjunctivae normal.   Cardiovascular:       Rate and Rhythm: Normal rate and regular rhythm.      Heart sounds: Normal heart sounds. No murmur heard.     No friction rub. No gallop.   Pulmonary:      Effort: Pulmonary effort is normal. No respiratory distress.      Breath sounds: Normal breath sounds. No stridor. No wheezing, rhonchi or rales.   Chest:      Chest wall: No tenderness.   Abdominal:      General: Abdomen is flat.      Palpations: Abdomen is soft.   Musculoskeletal:         General: Normal range of motion.      Cervical back: Normal range of motion and neck supple.   Lymphadenopathy:      Cervical: No cervical adenopathy.   Skin:     General: Skin is warm and dry.      Findings: No erythema, lesion or rash.   Neurological:      General: No focal deficit present.      Mental Status: He is alert. Mental status is at baseline.   Psychiatric:         Mood and Affect: Mood normal.         Behavior: Behavior normal.         Thought Content: Thought content normal.         Judgment: Judgment normal.            Current Medications[1]    Summary of the labs over the past 6 months:    Office Visit on 05/13/2025   Component Date Value Ref Range Status    POC Rapid Strep 05/13/2025 Negative  Negative Final    POC AMERICA-COV-2 AG 05/13/2025 Presumptive negative test for SARS-CoV-2 (no antigen detected)  Presumptive negative test for SARS-CoV-2 (no antigen detected) Final    POC Rapid Influenza A 05/13/2025 Negative  Negative Final    POC Rapid Influenza B 05/13/2025 Negative  Negative Final   Clinical Support on 04/25/2025   Component Date Value Ref Range Status    Fentanyl 04/25/2025 NEGATIVE  <0.5 ng/mL Final    Amphetamines 04/25/2025 NEGATIVE  <500 ng/mL Final    Barbiturates 04/25/2025 NEGATIVE  <300 ng/mL Final    Benzodiazepines 04/25/2025 NEGATIVE  <100 ng/mL Final    Cocaine Metabolite 04/25/2025 NEGATIVE  <150 ng/mL Final    Marijuana Metabolite 04/25/2025 NEGATIVE  <20 ng/mL Final    Methadone Metabolite 04/25/2025 NEGATIVE  <100 ng/mL Final     Opiates 04/25/2025 NEGATIVE  <100 ng/mL Final    Oxycodone 04/25/2025 NEGATIVE  <100 ng/mL Final    Phencyclidine 04/25/2025 NEGATIVE  <25 ng/mL Final    Creatinine 04/25/2025 123.1  > or = 20.0 mg/dL Final    pH 04/25/2025 5.3  4.5 - 9.0 Final    Oxidant 04/25/2025 NEGATIVE  <200 mcg/mL Final    Notes and Comments 04/25/2025    Final   Office Visit on 01/21/2025   Component Date Value Ref Range Status    Amphetamine Screen, Urine 01/21/2025 Presumptive Negative  Presumptive Negative Final    Barbiturate Screen, Urine 01/21/2025 Presumptive Negative  Presumptive Negative Final    Benzodiazepines Screen, Urine 01/21/2025 Presumptive Negative  Presumptive Negative Final    Cannabinoid Screen, Urine 01/21/2025 Presumptive Positive (A)  Presumptive Negative Final    Cocaine Metabolite Screen, Urine 01/21/2025 Presumptive Negative  Presumptive Negative Final    Fentanyl Screen, Urine 01/21/2025 Presumptive Negative  Presumptive Negative Final    Opiate Screen, Urine 01/21/2025 Presumptive Negative  Presumptive Negative Final    Oxycodone Screen, Urine 01/21/2025 Presumptive Negative  Presumptive Negative Final    PCP Screen, Urine 01/21/2025 Presumptive Negative  Presumptive Negative Final    Methadone Screen, Urine 01/21/2025 Presumptive Negative  Presumptive Negative Final    45-Ymj-4-carboxy-THC, Urn, Quant 01/21/2025 38  ng/mL Final         Assessment/Plan   Diagnoses and all orders for this visit:  Allergic rhinitis due to pollen, unspecified seasonality  Chronic allergic conjunctivitis  Chronic rhinitis      You have not had much success with the nasal saline rinses nor with the nasal sprays (including azelastine , flonase ,and ipratropium )     Will try an oral antihistamine medication.    Refer to ENT for their opinion and management.    If not better, follow-up for additional allergy testing as per last note  (particularly if the symptoms are MAINLY or ONLY during the spring/summer when you have your allergies-  "your allergy testing showed high positive to spring/summer pollens).      Tñoo Haley MD              [1]   Current Outpatient Medications   Medication Sig Dispense Refill    albuterol (Ventolin HFA) 90 mcg/actuation inhaler Inhale 2 puffs every 4 hours if needed for wheezing or shortness of breath. 8 g 0    cetirizine (ZyrTEC) 10 mg tablet Take 1 tablet (10 mg) by mouth once daily as needed for allergies. 30 tablet 11    ergocalciferol (Vitamin D-2) 1.25 MG (13577 UT) capsule Take 1 capsule (50,000 Units) by mouth 1 (one) time per week.      losartan (Cozaar) 100 mg tablet Take 1 tablet (100 mg) by mouth once daily. 90 tablet 3    metoprolol succinate XL (Toprol-XL) 100 mg 24 hr tablet Take 1 tablet (100 mg) by mouth 2 times a day. 180 tablet 3    pantoprazole (ProtoNix) 40 mg EC tablet Take 1 tablet (40 mg) by mouth once daily. Do not crush, chew, or split. 90 tablet 3    rosuvastatin (Crestor) 5 mg tablet Take 1 tablet (5 mg) by mouth once daily. 90 tablet 3    syringe with needle 3 mL 25 gauge x 1\" syringe To use for testosterone injection 6 each 1    tadalafil 20 mg tablet TAKE 1 TABLET(20 MG) BY MOUTH 1 HOUR BEFORE ACTIVITY AS NEEDED 30 tablet 1    testosterone cypionate (Depo-Testosterone) 200 mg/mL injection Inject 1 mL (200 mg) into the muscle every 14 (fourteen) days. 2 mL 2    triamterene-hydrochlorothiazid (Maxzide-25) 37.5-25 mg tablet TAKE 1 TABLET BY MOUTH TWICE DAILY 90 tablet 3     No current facility-administered medications for this visit.     "

## 2025-07-14 ENCOUNTER — APPOINTMENT (OUTPATIENT)
Dept: ALLERGY | Facility: CLINIC | Age: 70
End: 2025-07-14
Payer: COMMERCIAL

## 2025-07-23 ENCOUNTER — APPOINTMENT (OUTPATIENT)
Dept: OTOLARYNGOLOGY | Facility: CLINIC | Age: 70
End: 2025-07-23
Payer: COMMERCIAL

## 2025-07-23 DIAGNOSIS — E29.1 TESTICULAR HYPERGONADOTROPIC HYPOGONADISM: ICD-10-CM

## 2025-07-23 RX ORDER — TESTOSTERONE CYPIONATE 200 MG/ML
200 INJECTION, SOLUTION INTRAMUSCULAR
Qty: 2 ML | Refills: 2 | Status: SHIPPED | OUTPATIENT
Start: 2025-07-23 | End: 2025-10-21

## 2025-07-26 LAB
25(OH)D3+25(OH)D2 SERPL-MCNC: 47 NG/ML (ref 30–100)
ALBUMIN SERPL-MCNC: 4.7 G/DL (ref 3.6–5.1)
ALP SERPL-CCNC: 40 U/L (ref 35–144)
ALT SERPL-CCNC: 24 U/L (ref 9–46)
ANION GAP SERPL CALCULATED.4IONS-SCNC: 9 MMOL/L (CALC) (ref 7–17)
AST SERPL-CCNC: 23 U/L (ref 10–35)
BASOPHILS # BLD AUTO: 71 CELLS/UL (ref 0–200)
BASOPHILS NFR BLD AUTO: 1.4 %
BILIRUB SERPL-MCNC: 0.7 MG/DL (ref 0.2–1.2)
BUN SERPL-MCNC: 22 MG/DL (ref 7–25)
CALCIUM SERPL-MCNC: 9.3 MG/DL (ref 8.6–10.3)
CHLORIDE SERPL-SCNC: 103 MMOL/L (ref 98–110)
CHOLEST SERPL-MCNC: 140 MG/DL
CHOLEST/HDLC SERPL: 4 (CALC)
CO2 SERPL-SCNC: 29 MMOL/L (ref 20–32)
CREAT SERPL-MCNC: 1.26 MG/DL (ref 0.7–1.35)
EGFRCR SERPLBLD CKD-EPI 2021: 62 ML/MIN/1.73M2
EOSINOPHIL # BLD AUTO: 250 CELLS/UL (ref 15–500)
EOSINOPHIL NFR BLD AUTO: 4.9 %
ERYTHROCYTE [DISTWIDTH] IN BLOOD BY AUTOMATED COUNT: 13.8 % (ref 11–15)
GLUCOSE SERPL-MCNC: 101 MG/DL (ref 65–99)
HCT VFR BLD AUTO: 44.8 % (ref 38.5–50)
HDLC SERPL-MCNC: 35 MG/DL
HGB BLD-MCNC: 15.2 G/DL (ref 13.2–17.1)
LDLC SERPL CALC-MCNC: 70 MG/DL (CALC)
LYMPHOCYTES # BLD AUTO: 1311 CELLS/UL (ref 850–3900)
LYMPHOCYTES NFR BLD AUTO: 25.7 %
MCH RBC QN AUTO: 33.7 PG (ref 27–33)
MCHC RBC AUTO-ENTMCNC: 33.9 G/DL (ref 32–36)
MCV RBC AUTO: 99.3 FL (ref 80–100)
MONOCYTES # BLD AUTO: 576 CELLS/UL (ref 200–950)
MONOCYTES NFR BLD AUTO: 11.3 %
NEUTROPHILS # BLD AUTO: 2892 CELLS/UL (ref 1500–7800)
NEUTROPHILS NFR BLD AUTO: 56.7 %
NONHDLC SERPL-MCNC: 105 MG/DL (CALC)
PLATELET # BLD AUTO: 193 THOUSAND/UL (ref 140–400)
PMV BLD REES-ECKER: 11.3 FL (ref 7.5–12.5)
POTASSIUM SERPL-SCNC: 4.3 MMOL/L (ref 3.5–5.3)
PROT SERPL-MCNC: 6.9 G/DL (ref 6.1–8.1)
PSA SERPL-MCNC: 0.76 NG/ML
RBC # BLD AUTO: 4.51 MILLION/UL (ref 4.2–5.8)
SODIUM SERPL-SCNC: 141 MMOL/L (ref 135–146)
TESTOST FREE SERPL-MCNC: 157.5 PG/ML (ref 35–155)
TESTOST SERPL-MCNC: 729 NG/DL (ref 250–1100)
TRIGL SERPL-MCNC: 264 MG/DL
TSH SERPL-ACNC: 1.22 MIU/L (ref 0.4–4.5)
WBC # BLD AUTO: 5.1 THOUSAND/UL (ref 3.8–10.8)

## 2025-07-29 ENCOUNTER — APPOINTMENT (OUTPATIENT)
Dept: PRIMARY CARE | Facility: CLINIC | Age: 70
End: 2025-07-29
Payer: COMMERCIAL

## 2025-07-30 DIAGNOSIS — K21.9 GASTROESOPHAGEAL REFLUX DISEASE WITHOUT ESOPHAGITIS: ICD-10-CM

## 2025-07-30 RX ORDER — PANTOPRAZOLE SODIUM 40 MG/1
40 TABLET, DELAYED RELEASE ORAL DAILY
Qty: 90 TABLET | Refills: 3 | Status: SHIPPED | OUTPATIENT
Start: 2025-07-30 | End: 2026-07-30

## 2025-07-30 NOTE — TELEPHONE ENCOUNTER
pantoprazole (ProtoNix) 40 mg EC tablet [[id number]]    Order Details  Dose: 40 mg Route: oral Frequency: Daily   Dispense Quantity: 90 tablet (90 day supply) Refills: 3    Duration: 365 days Dispense As Written: No          Sig: Take 1 tablet (40 mg) by mouth once daily. Do not crush, chew, or split.         Start Date: 10/21/24 End Date: 10/21/25 after 365 doses   Written Date: 10/21/24     Needs a refill oh this please send to [facility] in [place]

## 2025-08-06 ENCOUNTER — APPOINTMENT (OUTPATIENT)
Dept: PRIMARY CARE | Facility: CLINIC | Age: 70
End: 2025-08-06
Payer: COMMERCIAL

## 2025-08-06 VITALS
WEIGHT: 223 LBS | OXYGEN SATURATION: 96 % | DIASTOLIC BLOOD PRESSURE: 67 MMHG | BODY MASS INDEX: 33.03 KG/M2 | HEIGHT: 69 IN | HEART RATE: 64 BPM | SYSTOLIC BLOOD PRESSURE: 122 MMHG

## 2025-08-06 DIAGNOSIS — E66.811 CLASS 1 OBESITY DUE TO EXCESS CALORIES WITH SERIOUS COMORBIDITY AND BODY MASS INDEX (BMI) OF 32.0 TO 32.9 IN ADULT: ICD-10-CM

## 2025-08-06 DIAGNOSIS — Z00.00 ANNUAL PHYSICAL EXAM: ICD-10-CM

## 2025-08-06 DIAGNOSIS — E78.5 DYSLIPIDEMIA (HIGH LDL; LOW HDL): Primary | ICD-10-CM

## 2025-08-06 DIAGNOSIS — E55.9 VITAMIN D DEFICIENCY: ICD-10-CM

## 2025-08-06 DIAGNOSIS — K21.9 GASTROESOPHAGEAL REFLUX DISEASE WITHOUT ESOPHAGITIS: ICD-10-CM

## 2025-08-06 DIAGNOSIS — E29.1 TESTICULAR HYPERGONADOTROPIC HYPOGONADISM: ICD-10-CM

## 2025-08-06 DIAGNOSIS — I10 BENIGN ESSENTIAL HYPERTENSION: ICD-10-CM

## 2025-08-06 DIAGNOSIS — E66.09 CLASS 1 OBESITY DUE TO EXCESS CALORIES WITH SERIOUS COMORBIDITY AND BODY MASS INDEX (BMI) OF 32.0 TO 32.9 IN ADULT: ICD-10-CM

## 2025-08-06 PROBLEM — J02.9 SORE THROAT: Status: RESOLVED | Noted: 2025-05-13 | Resolved: 2025-08-06

## 2025-08-06 PROBLEM — J06.9 URI WITH COUGH AND CONGESTION: Status: RESOLVED | Noted: 2025-05-13 | Resolved: 2025-08-06

## 2025-08-06 PROCEDURE — 3008F BODY MASS INDEX DOCD: CPT | Performed by: NURSE PRACTITIONER

## 2025-08-06 PROCEDURE — 99397 PER PM REEVAL EST PAT 65+ YR: CPT | Performed by: NURSE PRACTITIONER

## 2025-08-06 PROCEDURE — 99214 OFFICE O/P EST MOD 30 MIN: CPT | Performed by: NURSE PRACTITIONER

## 2025-08-06 PROCEDURE — 3074F SYST BP LT 130 MM HG: CPT | Performed by: NURSE PRACTITIONER

## 2025-08-06 PROCEDURE — 1036F TOBACCO NON-USER: CPT | Performed by: NURSE PRACTITIONER

## 2025-08-06 PROCEDURE — 3078F DIAST BP <80 MM HG: CPT | Performed by: NURSE PRACTITIONER

## 2025-08-06 PROCEDURE — 1159F MED LIST DOCD IN RCRD: CPT | Performed by: NURSE PRACTITIONER

## 2025-08-06 PROCEDURE — 1160F RVW MEDS BY RX/DR IN RCRD: CPT | Performed by: NURSE PRACTITIONER

## 2025-08-06 RX ORDER — TRIAMTERENE AND HYDROCHLOROTHIAZIDE 37.5; 25 MG/1; MG/1
1 TABLET ORAL 2 TIMES DAILY
Qty: 180 TABLET | Refills: 3 | Status: SHIPPED | OUTPATIENT
Start: 2025-08-06 | End: 2026-08-06

## 2025-08-06 RX ORDER — FENOFIBRATE 160 MG/1
160 TABLET ORAL DAILY
Qty: 90 TABLET | Refills: 3 | Status: SHIPPED | OUTPATIENT
Start: 2025-08-06 | End: 2026-08-06

## 2025-08-06 RX ORDER — TESTOSTERONE CYPIONATE 200 MG/ML
200 INJECTION, SOLUTION INTRAMUSCULAR
Qty: 3 ML | Refills: 0 | Status: SHIPPED | OUTPATIENT
Start: 2025-08-06 | End: 2025-11-04

## 2025-08-06 ASSESSMENT — ANXIETY QUESTIONNAIRES
GAD7 TOTAL SCORE: 0
2. NOT BEING ABLE TO STOP OR CONTROL WORRYING: NOT AT ALL
4. TROUBLE RELAXING: NOT AT ALL
7. FEELING AFRAID AS IF SOMETHING AWFUL MIGHT HAPPEN: NOT AT ALL
1. FEELING NERVOUS, ANXIOUS, OR ON EDGE: NOT AT ALL
3. WORRYING TOO MUCH ABOUT DIFFERENT THINGS: NOT AT ALL
5. BEING SO RESTLESS THAT IT IS HARD TO SIT STILL: NOT AT ALL
6. BECOMING EASILY ANNOYED OR IRRITABLE: NOT AT ALL

## 2025-08-06 ASSESSMENT — PATIENT HEALTH QUESTIONNAIRE - PHQ9
2. FEELING DOWN, DEPRESSED OR HOPELESS: NOT AT ALL
SUM OF ALL RESPONSES TO PHQ9 QUESTIONS 1 AND 2: 0
1. LITTLE INTEREST OR PLEASURE IN DOING THINGS: NOT AT ALL

## 2025-08-06 ASSESSMENT — ENCOUNTER SYMPTOMS
DEPRESSION: 0
LOSS OF SENSATION IN FEET: 0
OCCASIONAL FEELINGS OF UNSTEADINESS: 0

## 2025-08-06 NOTE — PROGRESS NOTES
"Subjective   Patient ID: Michael Babb is a 70 y.o. male who presents for Annual Exam.    Patient is follow-up for annual physical exam, lab results reviewed and management of hypertension, testicular hypergonadotropic hypogonadism, vitamin D deficiency, erectile dysfunction, dyslipidemia, GERD and obesity.  He follows with an allergist for management of chronic allergic rhinitis.  His lab results are unremarkable besides elevated triglyceride at 264 and mildly elevated testosterone level at 157.5.  He is compliant with his prescribed medications.  He completed a screening colonoscopy on 1/15/2024 with recommendation to repeat for surveillance in 5 years.  He denies acute medical complaint.         Review of Systems   All other systems reviewed and are negative.      Objective   /67 (BP Location: Left arm, Patient Position: Sitting, BP Cuff Size: Large adult)   Pulse 64   Ht 1.753 m (5' 9\")   Wt 101 kg (223 lb)   SpO2 96%   BMI 32.93 kg/m²     Physical Exam  Constitutional:       Appearance: He is obese.   HENT:      Head: Normocephalic and atraumatic.      Right Ear: External ear normal.      Left Ear: External ear normal.      Nose: Nose normal.      Mouth/Throat:      Mouth: Mucous membranes are moist.     Eyes:      Extraocular Movements: Extraocular movements intact.      Conjunctiva/sclera: Conjunctivae normal.      Pupils: Pupils are equal, round, and reactive to light.       Cardiovascular:      Rate and Rhythm: Normal rate and regular rhythm.      Pulses: Normal pulses.      Heart sounds: Normal heart sounds.   Pulmonary:      Effort: Pulmonary effort is normal.      Breath sounds: Normal breath sounds.   Abdominal:      General: Bowel sounds are normal.      Palpations: Abdomen is soft.     Musculoskeletal:      Cervical back: Neck supple.     Skin:     General: Skin is warm and dry.     Neurological:      General: No focal deficit present.      Mental Status: He is alert and oriented to " person, place, and time.     Psychiatric:         Mood and Affect: Mood normal.         Behavior: Behavior normal.         Thought Content: Thought content normal.         Judgment: Judgment normal.         Assessment/Plan   Problem List Items Addressed This Visit       Testicular hypergonadotropic hypogonadism

## 2025-08-06 NOTE — ASSESSMENT & PLAN NOTE
Testosterone level is slightly elevated at 157.5.  Reduce testosterone injection to once every 4 weeks.  Recheck testosterone level with next blood draw.

## 2025-08-06 NOTE — PATIENT INSTRUCTIONS
Your lab results are unremarkable besides elevated triglyceride level at 264 and mildly elevated testosterone level at 157.5.  I have started you on fenofibrate 160 mg daily for elevated triglyceride level and reduced your testosterone injection to once every 4 weeks.  You completed a screening colonoscopy on 1/15/2024 with recommendation to repeat for surveillance in 5 years.  Continue taking all other medications as prescribed and complete labs a few days prior to 3 months follow-up.

## 2025-08-06 NOTE — ASSESSMENT & PLAN NOTE
12 minutes spent discussing diet and exercise interventions for management of overweight and obesity.

## 2025-08-06 NOTE — ASSESSMENT & PLAN NOTE
Blood pressure is at goal, continue triamterene/hydrochlorothiazide 37.5/25 mg twice daily, metoprolol  mg twice daily and losartan 100 mg daily.

## 2025-08-06 NOTE — ASSESSMENT & PLAN NOTE
Lipid panel is good besides triglyceride level that is elevated at 264.  Start fenofibrate 160 mg daily and continue rosuvastatin 5 mg daily.  Check lipid panel with next blood draw.

## 2025-08-21 ENCOUNTER — TELEPHONE (OUTPATIENT)
Dept: PRIMARY CARE | Facility: CLINIC | Age: 70
End: 2025-08-21
Payer: COMMERCIAL

## 2025-08-21 DIAGNOSIS — E66.09 CLASS 1 OBESITY DUE TO EXCESS CALORIES WITH SERIOUS COMORBIDITY AND BODY MASS INDEX (BMI) OF 32.0 TO 32.9 IN ADULT: ICD-10-CM

## 2025-08-21 DIAGNOSIS — N52.9 ERECTILE DYSFUNCTION, UNSPECIFIED ERECTILE DYSFUNCTION TYPE: ICD-10-CM

## 2025-08-21 DIAGNOSIS — E29.1 TESTICULAR HYPERGONADOTROPIC HYPOGONADISM: Primary | ICD-10-CM

## 2025-08-21 DIAGNOSIS — E66.811 CLASS 1 OBESITY DUE TO EXCESS CALORIES WITH SERIOUS COMORBIDITY AND BODY MASS INDEX (BMI) OF 32.0 TO 32.9 IN ADULT: ICD-10-CM

## 2025-08-21 RX ORDER — TESTOSTERONE CYPIONATE 200 MG/ML
200 INJECTION, SOLUTION INTRAMUSCULAR
Qty: 3 ML | Refills: 0 | Status: SHIPPED | OUTPATIENT
Start: 2025-08-21 | End: 2025-11-19

## 2025-11-06 ENCOUNTER — APPOINTMENT (OUTPATIENT)
Dept: PRIMARY CARE | Facility: CLINIC | Age: 70
End: 2025-11-06
Payer: COMMERCIAL

## 2025-12-09 ENCOUNTER — APPOINTMENT (OUTPATIENT)
Dept: DERMATOLOGY | Facility: CLINIC | Age: 70
End: 2025-12-09
Payer: COMMERCIAL